# Patient Record
Sex: FEMALE | Race: WHITE | NOT HISPANIC OR LATINO | Employment: FULL TIME | URBAN - METROPOLITAN AREA
[De-identification: names, ages, dates, MRNs, and addresses within clinical notes are randomized per-mention and may not be internally consistent; named-entity substitution may affect disease eponyms.]

---

## 2020-04-20 ENCOUNTER — LAB REQUISITION (OUTPATIENT)
Dept: LAB | Facility: HOSPITAL | Age: 40
End: 2020-04-20

## 2020-04-20 DIAGNOSIS — N92.1 EXCESSIVE AND FREQUENT MENSTRUATION WITH IRREGULAR CYCLE: ICD-10-CM

## 2020-06-12 ENCOUNTER — APPOINTMENT (OUTPATIENT)
Dept: GENERAL RADIOLOGY | Facility: HOSPITAL | Age: 40
End: 2020-06-12

## 2020-06-12 ENCOUNTER — TRANSCRIBE ORDERS (OUTPATIENT)
Dept: ADMINISTRATIVE | Facility: HOSPITAL | Age: 40
End: 2020-06-12

## 2020-06-12 DIAGNOSIS — M25.511 ACUTE PAIN OF RIGHT SHOULDER: Primary | ICD-10-CM

## 2020-11-24 ENCOUNTER — TELEPHONE (OUTPATIENT)
Dept: ENDOCRINOLOGY | Facility: CLINIC | Age: 40
End: 2020-11-24

## 2020-11-24 DIAGNOSIS — E55.9 VITAMIN D DEFICIENCY: ICD-10-CM

## 2020-11-24 DIAGNOSIS — E05.00 GRAVES' DISEASE: Primary | ICD-10-CM

## 2020-11-24 NOTE — TELEPHONE ENCOUNTER
PT CALLED AND ASKED IF WE COULD PUT LAB ORDERS FOR HER INTO Epic PRIOR TO HER NEXT APPT. PLEASE AND THANK YOU

## 2021-01-19 ENCOUNTER — TELEPHONE (OUTPATIENT)
Dept: ENDOCRINOLOGY | Facility: CLINIC | Age: 41
End: 2021-01-19

## 2021-01-19 NOTE — TELEPHONE ENCOUNTER
Pt has a FOV on 1/26. She wanted to know if her lab orders are still active? I looked and did see a future lab order put in from 11/24 but I did not see an expiration date like I normally would and just wanted clarification.

## 2021-01-20 ENCOUNTER — LAB (OUTPATIENT)
Dept: LAB | Facility: HOSPITAL | Age: 41
End: 2021-01-20

## 2021-01-20 DIAGNOSIS — E55.9 VITAMIN D DEFICIENCY: ICD-10-CM

## 2021-01-20 DIAGNOSIS — E05.00 GRAVES' DISEASE: ICD-10-CM

## 2021-01-20 PROCEDURE — 82306 VITAMIN D 25 HYDROXY: CPT

## 2021-01-20 PROCEDURE — 84439 ASSAY OF FREE THYROXINE: CPT

## 2021-01-20 PROCEDURE — 84443 ASSAY THYROID STIM HORMONE: CPT

## 2021-01-20 NOTE — TELEPHONE ENCOUNTER
SHANNANOM advising pt. There are active lab orders in for her if she is going to a [facility] facility if she os not then we would have to send lab orders somewhere for her. CELIA

## 2021-01-21 LAB
25(OH)D3 SERPL-MCNC: 18.4 NG/ML (ref 30–100)
T4 FREE SERPL-MCNC: 1.28 NG/DL (ref 0.93–1.7)
TSH SERPL DL<=0.05 MIU/L-ACNC: 1.51 UIU/ML (ref 0.27–4.2)

## 2021-01-26 ENCOUNTER — OFFICE VISIT (OUTPATIENT)
Dept: ENDOCRINOLOGY | Facility: CLINIC | Age: 41
End: 2021-01-26

## 2021-01-26 VITALS
WEIGHT: 206 LBS | TEMPERATURE: 97.1 F | DIASTOLIC BLOOD PRESSURE: 90 MMHG | HEART RATE: 104 BPM | OXYGEN SATURATION: 98 % | BODY MASS INDEX: 40.44 KG/M2 | HEIGHT: 60 IN | SYSTOLIC BLOOD PRESSURE: 140 MMHG

## 2021-01-26 DIAGNOSIS — E66.01 CLASS 3 SEVERE OBESITY DUE TO EXCESS CALORIES WITHOUT SERIOUS COMORBIDITY WITH BODY MASS INDEX (BMI) OF 40.0 TO 44.9 IN ADULT (HCC): ICD-10-CM

## 2021-01-26 DIAGNOSIS — E55.9 VITAMIN D DEFICIENCY: ICD-10-CM

## 2021-01-26 DIAGNOSIS — E05.00 GRAVES' DISEASE: Primary | ICD-10-CM

## 2021-01-26 PROBLEM — E66.813 CLASS 3 SEVERE OBESITY DUE TO EXCESS CALORIES WITHOUT SERIOUS COMORBIDITY WITH BODY MASS INDEX (BMI) OF 40.0 TO 44.9 IN ADULT: Status: ACTIVE | Noted: 2021-01-26

## 2021-01-26 PROCEDURE — 99214 OFFICE O/P EST MOD 30 MIN: CPT | Performed by: PHYSICIAN ASSISTANT

## 2021-01-26 RX ORDER — ERGOCALCIFEROL 1.25 MG/1
50000 CAPSULE ORAL WEEKLY
Qty: 12 CAPSULE | Refills: 0 | Status: SHIPPED | OUTPATIENT
Start: 2021-01-26

## 2021-01-26 NOTE — PROGRESS NOTES
Office Note      Date: 2021  Patient Name: Linda Snell  MRN: [id number]  : 1980    Chief Complaint   Patient presents with   • Thyroid Problem       History of Present Illness:   Linda Snell is a 40 y.o. female who presents today for Graves' disease and vitamin D deficiency.   It has been a year since her last appointment.  She reports recently she has noticed increased palpitations that have been worse the last month.  She is wondering if this could be her thyroid.  She is not on any thyroid medication at this time.  She denies any other symptoms of hyper or hypothyroidism other than fatigue.  She reports she finished the prescription vitamin D we prescribed last January after 12 weeks and was unable to follow-up due to Covid.  She is not taking any vitamin D currently.  She reports she never was able to schedule nutrition counseling.  She reports she is not sure if she was just unable to get an appointment scheduled due to Covid her she was never contacted.  She checked into [facility] with her insurance and this is not covered.  She continues to see her eye doctor every 6 months pressure in her eyes is stable at this time.  She denies any changes in her neck.    Subjective      Review of Systems:  Review of Systems   Constitutional: Positive for fatigue. Negative for activity change, appetite change, chills, diaphoresis, fever and unexpected weight change.   Cardiovascular: Positive for palpitations. Negative for chest pain and leg swelling.   Gastrointestinal: Negative.    Endocrine: Negative.    Neurological: Negative.        The following portions of the patient's history were reviewed and updated as appropriate: allergies, current medications, past family history, past medical history, past social history, past surgical history and problem list.    Objective     Vitals:    21 1510   BP: 140/90   BP Location: Right arm   Patient Position: Sitting   Cuff Size: Adult  "  Pulse: 104   Temp: 97.1 °F (36.2 °C)   TempSrc: Infrared   SpO2: 98%   Weight: 93.4 kg (206 lb)   Height: 152.4 cm (60\")   PainSc: 0-No pain     Body mass index is 40.23 kg/m².    Physical Exam  Vitals signs reviewed.   Constitutional:       Appearance: Normal appearance. She is obese.   Neck:      Thyroid: No thyroid mass, thyromegaly or thyroid tenderness.   Lymphadenopathy:      Cervical: No cervical adenopathy.   Neurological:      Mental Status: She is alert.         TSH  1/20/2021  1.51    FREE T4  Free T4   Date Value Ref Range Status   01/20/2021 1.28 0.93 - 1.70 ng/dL Final           Assessment / Plan      Assessment & Plan:  1. Graves' disease  Her TFTs were normal last week.  Reviewed results with patient advised her we do not need any thyroid medication at this time.    - T4, Free; Future  - TSH; Future    2. Vitamin D deficiency  Vitamin D level is low at 18.4.  We will add ergocalciferol 50,000 IUs weekly for 12 weeks and recheck vitamin D level in 3 months for monitoring.  She will also continue her vitamin D3 gummy 2000 IUs once a week.  - Vitamin D 25 Hydroxy; Future     3. Obesity    Her weight is up 5 pounds since January.  Will send a new referral for nutrition education.  Saxenda is not covered under her insurance.    Return in about 6 months (around 7/26/2021) for Recheck.    JOHANN Barrera   01/26/2021  "

## 2021-02-04 ENCOUNTER — TRANSCRIBE ORDERS (OUTPATIENT)
Dept: ADMINISTRATIVE | Facility: HOSPITAL | Age: 41
End: 2021-02-04

## 2021-02-04 DIAGNOSIS — Z12.31 VISIT FOR SCREENING MAMMOGRAM: Primary | ICD-10-CM

## 2021-02-10 ENCOUNTER — APPOINTMENT (OUTPATIENT)
Dept: MAMMOGRAPHY | Facility: HOSPITAL | Age: 41
End: 2021-02-10

## 2021-02-12 ENCOUNTER — HOSPITAL ENCOUNTER (OUTPATIENT)
Dept: NUTRITION | Facility: HOSPITAL | Age: 41
Setting detail: RECURRING SERIES
Discharge: HOME | End: 2021-02-12

## 2021-02-12 VITALS — BODY MASS INDEX: 41.23 KG/M2 | HEIGHT: 60 IN | WEIGHT: 210 LBS

## 2021-02-12 PROCEDURE — 97802 MEDICAL NUTRITION INDIV IN: CPT

## 2021-02-12 NOTE — CONSULTS
Adult Outpatient Nutrition  Assessment/PES    Patient Name:  Linda Snell  YOB: 1980  MRN: [id number]    Assessment Date:  2/12/2021    Comments:      This medical referred consult was provided via ZOOM as patient was unable to attend an in-office appointment today due to the COVID-19 crisis. Consent for treatment was given verbally. RD spent a total of 60 minutes with patient today.      Patient is a 40 year old female seen today for an initial nutrition visit. Patient shared she is seeking support for sustainable weight loss. She reported she has a h/o indigestion and graves disease. She also reported she has very low Vitamin D levels (01/20/21 - 18.4 ng/mL). She is taking 50,000 IU Vitamin D weekly. Patient reported she experienced weight gain recently. In the past, she stated she could lose weight fairly quickly by going on a diet, but she reported things have not been working for her this time around. Patient works a full time job Monday through Friday. She shared that due to her low vitamin d levels, she experiences severe fatigue. Patient stated that she hopes by developing healthier eating habits it will help with her energy levels and overall health. She reported she is not involved in an exercise routine at the moment and would like to work on nutrition first. She reported she struggles with feeling hungry and snacking continuously out of boredom. Health literacy assessment completed prior to visit and patient demonstrated adequate health literacy. Patient stated she has no specific questions or topics she would like to discuss today.    RD discussed the importance of eating consistent and balanced meals. RD recommended patient avoid skipping meals or going more than 4-5 hours without a balanced meal or snack. We discussed how not receiving consistent energy can lead to over eating later on in the day. We discussed the importance of honoring our hunger and fullness ques and choosing  foods that are balanced, nutritious, and filling. RD used the plate method to discuss the components of a balanced meal. Patient compared her typical meal by sharing her plate is usually half rice with 1/4 vegetables and 1/4 protein. She stated this tool was very helpful in giving her an idea of portion sizes.  RD shared [email or web address] with mother as a free recipe resource for meal ideas. We discussed the importance of eating balanced meals and snacks to promote satiety and to provide our bodies with the nutrition we need. RD recommended patient have a source of protein, carbohydrates, and heart healthy fat with every meal. Patient was able to teach back concepts discussed by suggesting she could have a serving of whole grain crackers with a cheese stick as a balanced snack. RD shared with the patient common foods that can lead to indigestion such as acid foods like tomatoes, chocolate, citrus, peppermint, spices. We also discussed how going too long without leading may promote indigestion from the acidity of our stomachs. Patient that this was very helpful. RD mailed patient several handouts regarding healthy recipes and eating out tips and tricks. Patient set her own goal and stated she feels confident she can achieve it. RD encouraged patient to contact her with any needs prior to our next visit.       Goals:  1) Follow the plate method 2 meals per day, 5 days per week.      Total of 60 minutes spent with patient on nutrition counseling. Education based on Academy of Dietetics and Nutrition guidelines. Patient was provided with RD's contact information. Follow up visit is scheduled for 03/12/21 at  Thank you for this referral.    General Info     Row Name 02/12/21 3588       Today's Session    Person(s) attending today's session  Patient     Services Used Today?  No       General Information    How Well Do You Speak English?  very well    Preferred Language  English    Are you able to read and  "write English?  Yes    Lives With  spouse    Last grade of school completed  college          Anthropometrics     Row Name 02/12/21 1623 02/12/21 1614       Anthropometrics    Height  152.4 cm (60\")  152.4 cm (60\")    Weight  --  95.3 kg (210 lb)       Ideal Body Weight (IBW)    Ideal Body Weight (IBW) (kg)  45.86  45.86    % Ideal Body Weight  --  207.71       Body Mass Index (BMI)    BMI (kg/m2)  --  41.1        Nutritional Info/Activity     Row Name 02/12/21 1615       Nutritional Information    Have you had weight changes?  Yes    Describe weight changes  Patient reported she has gained 47 lbs since 2017.    Have you tried to lose weight before?  Yes    List programs tried, date, and success  counting calories and walking 1-2 miles daily    What is your motivation to lose weight?  health, trying to conceive    What cultural diet influences are important for you to follow?  none    Do you have difficulty chewing food?  No    Functional Status  able to prepare meals;able to purchase food    List any food cravings/trigger foods you have  sweets, rice    How often during the day do you find yourself snacking?  throughout the day    Food Behaviors  Stress eater;Boredom eater;Comfort eater;Continuous snacking    How often do you eat out and where?  1-3 times per week    Do you use Food Assistance programs (WIC, food stamps, food bank)?  no    Do you need information about Food Assistance programs?  no    How many times do you drink milk per day?  0    How many times do you eat fruit per day?  0    How many times do you eat vegetables per day?  1    How many times do you drink juice per day?  0    How many times do you eat candy/chocolates per day?  0    How many times do you eat baked goods per day?  1    How many times do you eat desserts per day?  0    How many times do you eat ice cream per day?  0    How many times do you eat snack foods per day?  1    How many diet sodas do you drink per day?  0    How many " "regular sodas do you drink per day?  0    How many times do you eat ethnic food per day?  1    How many times do you drink alcohol per day?  0    How many times do you have caffeine per day?  1    How many servings of artificial sweetner do you have per day?  0    How many meals do you eat each day?  2    How many snacks do you eat each day?  2    What is the biggest challenge you have with your diet?  -- Energy and cravings    What type of support do you currently use to help you with your health issues?  none    Enter everything you can remember eating in the last 24 hours (1 day) Breakfast: 1/2 cup oatmeal    Snack: 4 oz strawberry milk, 1 small bag dried noodle snack    Lunch: peanut butter and jelly sandwich with medium starbucks frappachino    Snack: handful of peanut butter pretzels    Dinner: 4 oz pork chop, 1/2 cup vegetables, 1 cup rice       Eating Environment    Eating environment  Alone       Physical Activity    Are you currently involved in an activity/exercise program?   No          Estimated/Assessed Needs     Row Name 02/12/21 1623 02/12/21 1614       Calculation Measurements    Weight Used For Calculations  95.3 kg (210 lb)  --    Height  152.4 cm (60\")  152.4 cm (60\")       Estimated/Assessed Needs    Additional Documentation  Brantley-St. Jeor Equation (Group)  --       Brantley-St. Jeor Equation    RMR (Brantley-St. Jeor Equation)  1544.05  --    Brantley-St. Jeor Activity Factors  1.2  --    Activity Factors (Brantley-St. Jeor)  1852.86  --                Problem/Interventions:  Problem 1     Row Name 02/12/21 1623          Nutrition Diagnoses Problem 1    Problem 1  Overweight/Obesity     Etiology (related to)  -- lifestyle     Signs/Symptoms (evidenced by)  BMI     BMI  Greater than 40                 Intervention Goal     Row Name 02/12/21 1624          Intervention Goal    General  Meet nutritional needs for age/condition     Weight  Appropriate weight loss             Education/Evaluation     " Row Name 02/12/21 1624          Education    Education  Advised regarding habits/behavior     Advised Regarding Habits/Behavior  Activity;Appropriate beverage;Appropriate portions;Eating pattern;Eating out;Food choices;Food prep;Meal planning;Increased nutrient density;Snacks        Monitor/Evaluation    Monitor  PO intake;Weight     Education Follow-up  Reinforce PRN           Electronically signed by:  Estefany Blanca RD  02/12/21 16:25 EST

## 2021-03-08 ENCOUNTER — HOSPITAL ENCOUNTER (OUTPATIENT)
Dept: MAMMOGRAPHY | Facility: HOSPITAL | Age: 41
Discharge: HOME | End: 2021-03-08
Admitting: OBSTETRICS & GYNECOLOGY

## 2021-03-08 DIAGNOSIS — Z12.31 VISIT FOR SCREENING MAMMOGRAM: ICD-10-CM

## 2021-03-08 PROCEDURE — 77067 SCR MAMMO BI INCL CAD: CPT | Performed by: RADIOLOGY

## 2021-03-08 PROCEDURE — 77063 BREAST TOMOSYNTHESIS BI: CPT

## 2021-03-08 PROCEDURE — 77063 BREAST TOMOSYNTHESIS BI: CPT | Performed by: RADIOLOGY

## 2021-03-08 PROCEDURE — 77067 SCR MAMMO BI INCL CAD: CPT

## 2021-03-12 ENCOUNTER — APPOINTMENT (OUTPATIENT)
Dept: NUTRITION | Facility: HOSPITAL | Age: 41
End: 2021-03-12

## 2021-03-24 ENCOUNTER — HOSPITAL ENCOUNTER (OUTPATIENT)
Dept: ULTRASOUND IMAGING | Facility: HOSPITAL | Age: 41
Discharge: HOME | End: 2021-03-24

## 2021-03-24 ENCOUNTER — HOSPITAL ENCOUNTER (OUTPATIENT)
Dept: MAMMOGRAPHY | Facility: HOSPITAL | Age: 41
Discharge: HOME | End: 2021-03-24

## 2021-03-24 DIAGNOSIS — R92.8 ABNORMAL MAMMOGRAM: ICD-10-CM

## 2021-03-24 PROCEDURE — 77065 DX MAMMO INCL CAD UNI: CPT | Performed by: RADIOLOGY

## 2021-03-24 PROCEDURE — 76642 ULTRASOUND BREAST LIMITED: CPT

## 2021-03-24 PROCEDURE — 77065 DX MAMMO INCL CAD UNI: CPT

## 2021-03-24 PROCEDURE — 76642 ULTRASOUND BREAST LIMITED: CPT | Performed by: RADIOLOGY

## 2021-03-24 PROCEDURE — G0279 TOMOSYNTHESIS, MAMMO: HCPCS

## 2021-03-24 PROCEDURE — 77061 BREAST TOMOSYNTHESIS UNI: CPT | Performed by: RADIOLOGY

## 2022-01-30 PROCEDURE — U0004 COV-19 TEST NON-CDC HGH THRU: HCPCS | Performed by: NURSE PRACTITIONER

## 2022-09-02 ENCOUNTER — TRANSCRIBE ORDERS (OUTPATIENT)
Dept: ADMINISTRATIVE | Facility: HOSPITAL | Age: 42
End: 2022-09-02

## 2022-09-02 DIAGNOSIS — Z12.31 VISIT FOR SCREENING MAMMOGRAM: Primary | ICD-10-CM

## 2022-10-04 ENCOUNTER — HOSPITAL ENCOUNTER (OUTPATIENT)
Dept: MAMMOGRAPHY | Facility: HOSPITAL | Age: 42
Discharge: HOME | End: 2022-10-04
Admitting: OBSTETRICS & GYNECOLOGY

## 2022-10-04 DIAGNOSIS — Z12.31 VISIT FOR SCREENING MAMMOGRAM: ICD-10-CM

## 2022-10-04 PROCEDURE — 77067 SCR MAMMO BI INCL CAD: CPT

## 2022-10-04 PROCEDURE — 77063 BREAST TOMOSYNTHESIS BI: CPT | Performed by: RADIOLOGY

## 2022-10-04 PROCEDURE — 77063 BREAST TOMOSYNTHESIS BI: CPT

## 2022-10-04 PROCEDURE — 77067 SCR MAMMO BI INCL CAD: CPT | Performed by: RADIOLOGY

## 2023-11-21 ENCOUNTER — OFFICE VISIT (OUTPATIENT)
Dept: ENDOCRINOLOGY | Facility: CLINIC | Age: 43
End: 2023-11-21
Payer: COMMERCIAL

## 2023-11-21 VITALS
HEART RATE: 78 BPM | SYSTOLIC BLOOD PRESSURE: 132 MMHG | OXYGEN SATURATION: 97 % | DIASTOLIC BLOOD PRESSURE: 90 MMHG | WEIGHT: 203.2 LBS | HEIGHT: 61 IN | BODY MASS INDEX: 38.36 KG/M2

## 2023-11-21 DIAGNOSIS — E55.9 VITAMIN D DEFICIENCY: ICD-10-CM

## 2023-11-21 DIAGNOSIS — E05.00 GRAVES' DISEASE: Primary | ICD-10-CM

## 2023-11-21 LAB
25(OH)D3+25(OH)D2 SERPL-MCNC: 24.7 NG/ML (ref 30–100)
T4 FREE SERPL-MCNC: 1.3 NG/DL (ref 0.93–1.7)
TSH SERPL DL<=0.005 MIU/L-ACNC: 1.56 UIU/ML (ref 0.27–4.2)

## 2023-11-21 PROCEDURE — 99214 OFFICE O/P EST MOD 30 MIN: CPT | Performed by: PHYSICIAN ASSISTANT

## 2023-11-21 PROCEDURE — 36415 COLL VENOUS BLD VENIPUNCTURE: CPT | Performed by: PHYSICIAN ASSISTANT

## 2023-11-21 RX ORDER — LISINOPRIL 20 MG/1
20 TABLET ORAL DAILY
COMMUNITY
Start: 2022-06-01

## 2023-11-21 RX ORDER — TIRZEPATIDE 5 MG/.5ML
5 INJECTION, SOLUTION SUBCUTANEOUS WEEKLY
COMMUNITY

## 2023-11-21 RX ORDER — ONDANSETRON HYDROCHLORIDE 8 MG/1
1 TABLET, FILM COATED ORAL AS NEEDED
COMMUNITY
Start: 2023-09-19

## 2023-11-21 NOTE — PROGRESS NOTES
Office Note      Date: 2023  Patient Name: Linda Snell  MRN: [id number]  : 1980    Chief Complaint   Patient presents with    Graves' Disease       History of Present Illness:   Linda Snell is a 43 y.o. female who presents today for follow-up for Graves' disease.  It has been almost 3 years since her last appointment with her primary care physician has been checking her thyroid levels and these have been within normal limits.  She has been off methimazole for years.  She reports overall she feels well.  She denies any symptoms of hyper or hypothyroidism.  She denies any changes in her eyes or neck today.  She continues to see the eye doctor every 6 months.  She reports she is going to a weight loss clinic and started Mounjaro for weight loss.  She reports overall she has been able to lose weight on this medication and things seem to be going well.  She does have some trouble with indigestion and reflux but this is nothing new.  She reports she was started on Dexilant but this is no longer covered by her insurance.    She was started on metformin about 6 months ago because her A1c was creeping up.  She reports she could not tolerate this and is no longer taking it.  She reports she was taking prescription vitamin D but has been off of this for the last 3 weeks.  She reports her primary care physician was checking this that she is due to have her vitamin D level checked and is asking if we can check it today.      Subjective      Review of Systems:  Review of Systems   Constitutional: Negative.    Cardiovascular: Negative.    Gastrointestinal: Negative.    Endocrine: Negative.    Neurological: Negative.        The following portions of the patient's history were reviewed and updated as appropriate: allergies, current medications, past family history, past medical history, past social history, past surgical history, and problem list.    Objective     Vitals:    23 0840   BP:  "132/90   BP Location: Left arm   Patient Position: Sitting   Cuff Size: Adult   Pulse: 78   SpO2: 97%   Weight: 92.2 kg (203 lb 3.2 oz)   Height: 153.7 cm (60.5\")     Body mass index is 39.03 kg/m².    Physical Exam  Vitals reviewed.   Constitutional:       General: She is not in acute distress.     Appearance: Normal appearance.   Neck:      Thyroid: No thyroid mass, thyromegaly or thyroid tenderness.   Lymphadenopathy:      Cervical: No cervical adenopathy.   Neurological:      Mental Status: She is alert.           Assessment / Plan      Assessment & Plan:  1. Graves' disease  TFTs pending today.  She is currently on no thyroid medication.  Will send note with results and plan.  - T4, Free; Future  - TSH; Future  - TSH  - T4, Free    2. Vitamin D deficiency  25-hydroxy vitamin D pending today.  Will send note with results and plan.  She has been off her vitamin D 50,000 IUs weekly for 3 weeks now.  - Vitamin D,25-Hydroxy; Future  - Vitamin D,25-Hydroxy       Return in about 1 year (around 11/21/2024) for Recheck.    This note was dictated using Dragon voice recognition.    JOHANN Barrera   11/21/2023  "

## 2023-11-22 RX ORDER — ERGOCALCIFEROL 1.25 MG/1
50000 CAPSULE ORAL WEEKLY
Qty: 12 CAPSULE | Refills: 0 | Status: SHIPPED | OUTPATIENT
Start: 2023-11-22

## 2024-07-29 ENCOUNTER — HOSPITAL ENCOUNTER (OUTPATIENT)
Facility: HOSPITAL | Age: 44
Setting detail: OBSERVATION
Discharge: HOME | End: 2024-08-01
Attending: STUDENT IN AN ORGANIZED HEALTH CARE EDUCATION/TRAINING PROGRAM | Admitting: SURGERY
Payer: COMMERCIAL

## 2024-07-29 ENCOUNTER — APPOINTMENT (OUTPATIENT)
Dept: CT IMAGING | Facility: HOSPITAL | Age: 44
End: 2024-07-29
Payer: COMMERCIAL

## 2024-07-29 ENCOUNTER — APPOINTMENT (OUTPATIENT)
Dept: ULTRASOUND IMAGING | Facility: HOSPITAL | Age: 44
End: 2024-07-29
Payer: COMMERCIAL

## 2024-07-29 DIAGNOSIS — Z90.49 S/P LAPAROSCOPIC CHOLECYSTECTOMY: ICD-10-CM

## 2024-07-29 DIAGNOSIS — R10.13 EPIGASTRIC PAIN: ICD-10-CM

## 2024-07-29 DIAGNOSIS — K81.0 ACUTE CHOLECYSTITIS: Primary | ICD-10-CM

## 2024-07-29 DIAGNOSIS — K80.00 ACUTE CALCULOUS CHOLECYSTITIS: ICD-10-CM

## 2024-07-29 DIAGNOSIS — K80.50 BILIARY COLIC: ICD-10-CM

## 2024-07-29 DIAGNOSIS — D72.829 LEUKOCYTOSIS, UNSPECIFIED TYPE: ICD-10-CM

## 2024-07-29 DIAGNOSIS — R11.2 NAUSEA AND VOMITING, UNSPECIFIED VOMITING TYPE: ICD-10-CM

## 2024-07-29 LAB
ALBUMIN SERPL-MCNC: 4.4 G/DL (ref 3.5–5.2)
ALBUMIN/GLOB SERPL: 1.3 G/DL
ALP SERPL-CCNC: 80 U/L (ref 39–117)
ALT SERPL W P-5'-P-CCNC: 14 U/L (ref 1–33)
ANION GAP SERPL CALCULATED.3IONS-SCNC: 14.4 MMOL/L (ref 5–15)
AST SERPL-CCNC: 12 U/L (ref 1–32)
BACTERIA UR QL AUTO: ABNORMAL /HPF
BASOPHILS # BLD AUTO: 0.07 10*3/MM3 (ref 0–0.2)
BASOPHILS NFR BLD AUTO: 0.4 % (ref 0–1.5)
BILIRUB SERPL-MCNC: 0.6 MG/DL (ref 0–1.2)
BILIRUB UR QL STRIP: NEGATIVE
BUN SERPL-MCNC: 15 MG/DL (ref 6–20)
BUN/CREAT SERPL: 18.1 (ref 7–25)
CALCIUM SPEC-SCNC: 9.6 MG/DL (ref 8.6–10.5)
CHLORIDE SERPL-SCNC: 101 MMOL/L (ref 98–107)
CLARITY UR: CLEAR
CO2 SERPL-SCNC: 21.6 MMOL/L (ref 22–29)
COLOR UR: YELLOW
CREAT SERPL-MCNC: 0.83 MG/DL (ref 0.57–1)
DEPRECATED RDW RBC AUTO: 39.8 FL (ref 37–54)
EGFRCR SERPLBLD CKD-EPI 2021: 89.3 ML/MIN/1.73
EOSINOPHIL # BLD AUTO: 0.09 10*3/MM3 (ref 0–0.4)
EOSINOPHIL NFR BLD AUTO: 0.5 % (ref 0.3–6.2)
ERYTHROCYTE [DISTWIDTH] IN BLOOD BY AUTOMATED COUNT: 12.9 % (ref 12.3–15.4)
GLOBULIN UR ELPH-MCNC: 3.3 GM/DL
GLUCOSE SERPL-MCNC: 187 MG/DL (ref 65–99)
GLUCOSE UR STRIP-MCNC: NEGATIVE MG/DL
HCT VFR BLD AUTO: 39.4 % (ref 34–46.6)
HGB BLD-MCNC: 13 G/DL (ref 12–15.9)
HGB UR QL STRIP.AUTO: ABNORMAL
HOLD SPECIMEN: NORMAL
HOLD SPECIMEN: NORMAL
HYALINE CASTS UR QL AUTO: ABNORMAL /LPF
IMM GRANULOCYTES # BLD AUTO: 0.08 10*3/MM3 (ref 0–0.05)
IMM GRANULOCYTES NFR BLD AUTO: 0.4 % (ref 0–0.5)
KETONES UR QL STRIP: ABNORMAL
LEUKOCYTE ESTERASE UR QL STRIP.AUTO: NEGATIVE
LIPASE SERPL-CCNC: 26 U/L (ref 13–60)
LYMPHOCYTES # BLD AUTO: 1.35 10*3/MM3 (ref 0.7–3.1)
LYMPHOCYTES NFR BLD AUTO: 7 % (ref 19.6–45.3)
MCH RBC QN AUTO: 28.3 PG (ref 26.6–33)
MCHC RBC AUTO-ENTMCNC: 33 G/DL (ref 31.5–35.7)
MCV RBC AUTO: 85.7 FL (ref 79–97)
MONOCYTES # BLD AUTO: 0.64 10*3/MM3 (ref 0.1–0.9)
MONOCYTES NFR BLD AUTO: 3.3 % (ref 5–12)
MUCOUS THREADS URNS QL MICRO: ABNORMAL /HPF
NEUTROPHILS NFR BLD AUTO: 17.19 10*3/MM3 (ref 1.7–7)
NEUTROPHILS NFR BLD AUTO: 88.4 % (ref 42.7–76)
NITRITE UR QL STRIP: NEGATIVE
NRBC BLD AUTO-RTO: 0 /100 WBC (ref 0–0.2)
PH UR STRIP.AUTO: <=5 [PH] (ref 5–8)
PLATELET # BLD AUTO: 354 10*3/MM3 (ref 140–450)
PMV BLD AUTO: 9.1 FL (ref 6–12)
POTASSIUM SERPL-SCNC: 4.4 MMOL/L (ref 3.5–5.2)
PROT SERPL-MCNC: 7.7 G/DL (ref 6–8.5)
PROT UR QL STRIP: ABNORMAL
RBC # BLD AUTO: 4.6 10*6/MM3 (ref 3.77–5.28)
RBC # UR STRIP: ABNORMAL /HPF
REF LAB TEST METHOD: ABNORMAL
SODIUM SERPL-SCNC: 137 MMOL/L (ref 136–145)
SP GR UR STRIP: >1.03 (ref 1–1.03)
SQUAMOUS #/AREA URNS HPF: ABNORMAL /HPF
TROPONIN T SERPL HS-MCNC: <6 NG/L
UROBILINOGEN UR QL STRIP: ABNORMAL
WBC # UR STRIP: ABNORMAL /HPF
WBC NRBC COR # BLD AUTO: 19.42 10*3/MM3 (ref 3.4–10.8)
WHOLE BLOOD HOLD COAG: NORMAL
WHOLE BLOOD HOLD SPECIMEN: NORMAL

## 2024-07-29 PROCEDURE — 84484 ASSAY OF TROPONIN QUANT: CPT | Performed by: STUDENT IN AN ORGANIZED HEALTH CARE EDUCATION/TRAINING PROGRAM

## 2024-07-29 PROCEDURE — 25510000001 IOPAMIDOL 61 % SOLUTION: Performed by: STUDENT IN AN ORGANIZED HEALTH CARE EDUCATION/TRAINING PROGRAM

## 2024-07-29 PROCEDURE — 25010000002 ONDANSETRON PER 1 MG: Performed by: STUDENT IN AN ORGANIZED HEALTH CARE EDUCATION/TRAINING PROGRAM

## 2024-07-29 PROCEDURE — 83690 ASSAY OF LIPASE: CPT | Performed by: STUDENT IN AN ORGANIZED HEALTH CARE EDUCATION/TRAINING PROGRAM

## 2024-07-29 PROCEDURE — G0378 HOSPITAL OBSERVATION PER HR: HCPCS

## 2024-07-29 PROCEDURE — 36415 COLL VENOUS BLD VENIPUNCTURE: CPT

## 2024-07-29 PROCEDURE — 96372 THER/PROPH/DIAG INJ SC/IM: CPT

## 2024-07-29 PROCEDURE — 80053 COMPREHEN METABOLIC PANEL: CPT | Performed by: STUDENT IN AN ORGANIZED HEALTH CARE EDUCATION/TRAINING PROGRAM

## 2024-07-29 PROCEDURE — 25010000002 PIPERACILLIN SOD-TAZOBACTAM PER 1 G: Performed by: SURGERY

## 2024-07-29 PROCEDURE — 76705 ECHO EXAM OF ABDOMEN: CPT

## 2024-07-29 PROCEDURE — 25010000002 ONDANSETRON PER 1 MG: Performed by: SURGERY

## 2024-07-29 PROCEDURE — 81001 URINALYSIS AUTO W/SCOPE: CPT | Performed by: STUDENT IN AN ORGANIZED HEALTH CARE EDUCATION/TRAINING PROGRAM

## 2024-07-29 PROCEDURE — 25010000002 HEPARIN (PORCINE) PER 1000 UNITS: Performed by: SURGERY

## 2024-07-29 PROCEDURE — 25010000002 MORPHINE PER 10 MG: Performed by: STUDENT IN AN ORGANIZED HEALTH CARE EDUCATION/TRAINING PROGRAM

## 2024-07-29 PROCEDURE — 96376 TX/PRO/DX INJ SAME DRUG ADON: CPT

## 2024-07-29 PROCEDURE — 74177 CT ABD & PELVIS W/CONTRAST: CPT

## 2024-07-29 PROCEDURE — 99285 EMERGENCY DEPT VISIT HI MDM: CPT

## 2024-07-29 PROCEDURE — 25010000002 HYDROMORPHONE PER 4 MG: Performed by: STUDENT IN AN ORGANIZED HEALTH CARE EDUCATION/TRAINING PROGRAM

## 2024-07-29 PROCEDURE — 85025 COMPLETE CBC W/AUTO DIFF WBC: CPT | Performed by: STUDENT IN AN ORGANIZED HEALTH CARE EDUCATION/TRAINING PROGRAM

## 2024-07-29 PROCEDURE — 96375 TX/PRO/DX INJ NEW DRUG ADDON: CPT

## 2024-07-29 PROCEDURE — 25810000003 SODIUM CHLORIDE 0.9 % SOLUTION: Performed by: STUDENT IN AN ORGANIZED HEALTH CARE EDUCATION/TRAINING PROGRAM

## 2024-07-29 PROCEDURE — 81025 URINE PREGNANCY TEST: CPT | Performed by: SURGERY

## 2024-07-29 PROCEDURE — 25010000002 HYDROMORPHONE 1 MG/ML SOLUTION: Performed by: SURGERY

## 2024-07-29 PROCEDURE — 25810000003 LACTATED RINGERS PER 1000 ML: Performed by: SURGERY

## 2024-07-29 PROCEDURE — 93005 ELECTROCARDIOGRAM TRACING: CPT | Performed by: STUDENT IN AN ORGANIZED HEALTH CARE EDUCATION/TRAINING PROGRAM

## 2024-07-29 PROCEDURE — 25010000002 PIPERACILLIN SOD-TAZOBACTAM PER 1 G: Performed by: STUDENT IN AN ORGANIZED HEALTH CARE EDUCATION/TRAINING PROGRAM

## 2024-07-29 RX ORDER — HEPARIN SODIUM 5000 [USP'U]/ML
5000 INJECTION, SOLUTION INTRAVENOUS; SUBCUTANEOUS EVERY 8 HOURS SCHEDULED
Status: DISCONTINUED | OUTPATIENT
Start: 2024-07-29 | End: 2024-08-01 | Stop reason: HOSPADM

## 2024-07-29 RX ORDER — SODIUM CHLORIDE 9 MG/ML
40 INJECTION, SOLUTION INTRAVENOUS AS NEEDED
Status: DISCONTINUED | OUTPATIENT
Start: 2024-07-29 | End: 2024-08-01 | Stop reason: HOSPADM

## 2024-07-29 RX ORDER — ONDANSETRON 2 MG/ML
4 INJECTION INTRAMUSCULAR; INTRAVENOUS ONCE
Status: COMPLETED | OUTPATIENT
Start: 2024-07-29 | End: 2024-07-29

## 2024-07-29 RX ORDER — LIDOCAINE HYDROCHLORIDE 20 MG/ML
5 SOLUTION OROPHARYNGEAL ONCE
Status: COMPLETED | OUTPATIENT
Start: 2024-07-29 | End: 2024-07-29

## 2024-07-29 RX ORDER — SODIUM CHLORIDE, SODIUM LACTATE, POTASSIUM CHLORIDE, CALCIUM CHLORIDE 600; 310; 30; 20 MG/100ML; MG/100ML; MG/100ML; MG/100ML
100 INJECTION, SOLUTION INTRAVENOUS CONTINUOUS
Status: DISCONTINUED | OUTPATIENT
Start: 2024-07-29 | End: 2024-07-30

## 2024-07-29 RX ORDER — ONDANSETRON 2 MG/ML
4 INJECTION INTRAMUSCULAR; INTRAVENOUS EVERY 6 HOURS PRN
Status: DISCONTINUED | OUTPATIENT
Start: 2024-07-29 | End: 2024-08-01 | Stop reason: HOSPADM

## 2024-07-29 RX ORDER — IBUPROFEN 600 MG/1
600 TABLET ORAL EVERY 6 HOURS PRN
Status: DISCONTINUED | OUTPATIENT
Start: 2024-07-29 | End: 2024-07-30

## 2024-07-29 RX ORDER — ALUMINA, MAGNESIA, AND SIMETHICONE 2400; 2400; 240 MG/30ML; MG/30ML; MG/30ML
15 SUSPENSION ORAL ONCE
Status: COMPLETED | OUTPATIENT
Start: 2024-07-29 | End: 2024-07-29

## 2024-07-29 RX ORDER — HYDROMORPHONE HYDROCHLORIDE 2 MG/ML
0.5 INJECTION, SOLUTION INTRAMUSCULAR; INTRAVENOUS; SUBCUTANEOUS ONCE
Status: COMPLETED | OUTPATIENT
Start: 2024-07-29 | End: 2024-07-29

## 2024-07-29 RX ORDER — SODIUM CHLORIDE 0.9 % (FLUSH) 0.9 %
10 SYRINGE (ML) INJECTION AS NEEDED
Status: DISCONTINUED | OUTPATIENT
Start: 2024-07-29 | End: 2024-08-01 | Stop reason: HOSPADM

## 2024-07-29 RX ORDER — DEXLANSOPRAZOLE 60 MG/1
60 CAPSULE, DELAYED RELEASE ORAL DAILY
COMMUNITY

## 2024-07-29 RX ORDER — SODIUM CHLORIDE 0.9 % (FLUSH) 0.9 %
10 SYRINGE (ML) INJECTION EVERY 12 HOURS SCHEDULED
Status: DISCONTINUED | OUTPATIENT
Start: 2024-07-29 | End: 2024-08-01 | Stop reason: HOSPADM

## 2024-07-29 RX ORDER — ACETAMINOPHEN 500 MG
1000 TABLET ORAL EVERY 6 HOURS PRN
Status: DISCONTINUED | OUTPATIENT
Start: 2024-07-29 | End: 2024-07-30

## 2024-07-29 RX ORDER — NALOXONE HCL 0.4 MG/ML
0.4 VIAL (ML) INJECTION
Status: DISCONTINUED | OUTPATIENT
Start: 2024-07-29 | End: 2024-08-01 | Stop reason: HOSPADM

## 2024-07-29 RX ADMIN — HEPARIN SODIUM 5000 UNITS: 5000 INJECTION INTRAVENOUS; SUBCUTANEOUS at 14:09

## 2024-07-29 RX ADMIN — IOPAMIDOL 100 ML: 612 INJECTION, SOLUTION INTRAVENOUS at 03:02

## 2024-07-29 RX ADMIN — PIPERACILLIN SODIUM AND TAZOBACTAM SODIUM 3.38 G: 3; .375 INJECTION, POWDER, LYOPHILIZED, FOR SOLUTION INTRAVENOUS at 06:37

## 2024-07-29 RX ADMIN — HYDROMORPHONE HYDROCHLORIDE 0.5 MG: 1 INJECTION, SOLUTION INTRAMUSCULAR; INTRAVENOUS; SUBCUTANEOUS at 13:13

## 2024-07-29 RX ADMIN — ONDANSETRON 4 MG: 2 INJECTION INTRAMUSCULAR; INTRAVENOUS at 01:58

## 2024-07-29 RX ADMIN — LIDOCAINE HYDROCHLORIDE 5 ML: 20 SOLUTION ORAL at 02:13

## 2024-07-29 RX ADMIN — Medication 10 ML: at 21:57

## 2024-07-29 RX ADMIN — SODIUM CHLORIDE 500 ML: 9 INJECTION, SOLUTION INTRAVENOUS at 01:58

## 2024-07-29 RX ADMIN — HYDROMORPHONE HYDROCHLORIDE 0.5 MG: 1 INJECTION, SOLUTION INTRAMUSCULAR; INTRAVENOUS; SUBCUTANEOUS at 21:56

## 2024-07-29 RX ADMIN — PIPERACILLIN SODIUM AND TAZOBACTAM SODIUM 3.38 G: 3; .375 INJECTION, POWDER, LYOPHILIZED, FOR SOLUTION INTRAVENOUS at 16:27

## 2024-07-29 RX ADMIN — ALUMINUM HYDROXIDE, MAGNESIUM HYDROXIDE, DIMETHICONE 15 ML: 400; 400; 40 SUSPENSION ORAL at 02:13

## 2024-07-29 RX ADMIN — SODIUM CHLORIDE 1000 ML: 9 INJECTION, SOLUTION INTRAVENOUS at 06:35

## 2024-07-29 RX ADMIN — ACETAMINOPHEN 1000 MG: 500 TABLET, FILM COATED ORAL at 22:21

## 2024-07-29 RX ADMIN — ONDANSETRON 4 MG: 2 INJECTION INTRAMUSCULAR; INTRAVENOUS at 10:58

## 2024-07-29 RX ADMIN — SODIUM CHLORIDE, POTASSIUM CHLORIDE, SODIUM LACTATE AND CALCIUM CHLORIDE 100 ML/HR: 600; 310; 30; 20 INJECTION, SOLUTION INTRAVENOUS at 08:54

## 2024-07-29 RX ADMIN — ONDANSETRON 4 MG: 2 INJECTION INTRAMUSCULAR; INTRAVENOUS at 04:12

## 2024-07-29 RX ADMIN — HEPARIN SODIUM 5000 UNITS: 5000 INJECTION INTRAVENOUS; SUBCUTANEOUS at 08:55

## 2024-07-29 RX ADMIN — MORPHINE SULFATE 4 MG: 4 INJECTION, SOLUTION INTRAMUSCULAR; INTRAVENOUS at 02:25

## 2024-07-29 RX ADMIN — HYDROMORPHONE HYDROCHLORIDE 0.5 MG: 2 INJECTION, SOLUTION INTRAMUSCULAR; INTRAVENOUS; SUBCUTANEOUS at 04:13

## 2024-07-29 RX ADMIN — HEPARIN SODIUM 5000 UNITS: 5000 INJECTION INTRAVENOUS; SUBCUTANEOUS at 21:56

## 2024-07-29 NOTE — ED NOTES
Pt sleeping after pain medication administration. O2 sat noted to be 88-90%. Pt placed on 2 LPM NC. Family at bedside. Lights dimmed to allow pt to rest.

## 2024-07-29 NOTE — H&P
"General Surgery Consult     Name:Linda Snell  Age: 44 y.o.  Gender: female  : 1980  MRN: [id number]  Visit Number: [id number]  Admit Date: 2024  Date of Service: 24    Patient Care Team:  Huma Steele APRN as PCP - General (Nurse Practitioner)    Reason for Consultation: Acute cholecystitis    Chief complaint : abdominal pain, nausea, vomiting      History of Present Illness:     Linda Snell is a 44 y.o. female patient who presented to the emergency department overnight complaining of a severe \"GERD attack.\"  She reports having severe GERD for at least the last 5 years for which she has been on and off various proton pump inhibitors.  However, she had worsening symptoms over the last month and had actually also been given Carafate due to the severity of her symptoms.  She reports having multiple episodes of similar symptoms prior to coming to the ER, but reports that all of these were fairly self-limited.  However, at 9 PM last night she developed what she described as another \"GERD attack\" characterized by right upper quadrant abdominal pain radiating to the back with multiple episodes of nausea, and vomiting.  She reports that the symptoms did not subside over time, subsequently prompting her to come to the emergency department.  Lab workup in the emergency department revealed an elevated white blood cell count at 19.42 with a noted left shift at 88% neutrophils.  Her comprehensive metabolic panel was largely unremarkable, specifically, her LFTs are normal. CT scan of the abdomen and pelvis revealed a distended gallbladder containing stones.  An ultrasound demonstrated similar findings.  I was contacted by the emergency department provider due to the patient's ongoing abdominal pain, and concern for acute cholecystitis.  We discussed the case, and I elected to admit the patient to the surgical service for definitive evaluation and management.        Patient Active Problem " List   Diagnosis    Graves' disease    Vitamin D deficiency    Class 3 severe obesity due to excess calories without serious comorbidity with body mass index (BMI) of 40.0 to 44.9 in adult    Acute cholecystitis    Acute calculous cholecystitis         Past Medical History:   Diagnosis Date    GERD (gastroesophageal reflux disease)     Graefe's disease     Graves disease     Hypertension     Hypothyroidism     Obesity     body mass index 30+-Obesity--Abstracted from [facility]    Vitamin D deficiency        Past Surgical History:   Procedure Laterality Date    PELVIC ABCESS DRAINAGE      WISDOM TOOTH EXTRACTION         Family History   Problem Relation Age of Onset    No Known Problems Mother     Kidney failure Father     Hypertension Father     No Known Problems Brother     Cancer Maternal Aunt     Breast cancer Maternal Aunt         DX AGE UNKNOWN    Ovarian cancer Neg Hx        Social History     Socioeconomic History    Marital status:    Tobacco Use    Smoking status: Never    Smokeless tobacco: Never   Vaping Use    Vaping status: Never Used   Substance and Sexual Activity    Alcohol use: Never    Drug use: Never    Sexual activity: Defer         Current Facility-Administered Medications:     sodium chloride 0.9 % flush 10 mL, 10 mL, Intravenous, PRN, Yariel Guerra MD    Medications Prior to Admission   Medication Sig Dispense Refill Last Dose    dexlansoprazole (Dexilant) 60 MG capsule Take 1 capsule by mouth Daily.       lisinopril (PRINIVIL,ZESTRIL) 20 MG tablet Take 1 tablet by mouth Daily.       ondansetron (ZOFRAN) 8 MG tablet Take 1 tablet by mouth As Needed. Due to Mounjaro Injection       Tirzepatide (Mounjaro) 5 MG/0.5ML solution pen-injector Inject 0.5 mL under the skin into the appropriate area as directed 1 (One) Time Per Week. (Patient not taking: Reported on 7/29/2024)   Not Taking    vitamin D (ERGOCALCIFEROL) 1.25 MG (76931 UT) capsule capsule Take 1 capsule by mouth 1 (One) Time Per  Week. For 12 weeks 12 capsule 0        Allergies   Allergen Reactions    Propylthiouracil Rash       Review of Systems   Constitutional: Negative.    HENT: Negative.     Eyes: Negative.    Respiratory: Negative.     Cardiovascular: Negative.    Gastrointestinal:  Positive for abdominal pain, nausea and vomiting.   Endocrine: Negative.    Genitourinary: Negative.    Musculoskeletal:  Positive for back pain.   Skin: Negative.    Allergic/Immunologic: Negative.    Neurological: Negative.    Hematological: Negative.    Psychiatric/Behavioral: Negative.         OBJECTIVE:     Vital Signs  Temp:  [98.1 °F (36.7 °C)] 98.1 °F (36.7 °C)  Heart Rate:  [58-85] 68  Resp:  [16-20] 16  BP: (118-149)/(71-99) 149/99    No intake/output data recorded.  No intake/output data recorded.      Physical Exam:      General Appearance:    Alert, cooperative, in no acute distress   Head:    Normocephalic, without obvious abnormality, atraumatic   Eyes:            Lids and lashes normal, conjunctivae and sclerae normal, no icterus   Ears:    Ears appear intact with no abnormalities noted   Lungs:     Respirations regular, even and unlabored    Heart:    Regular rhythm and normal rate   Abdomen:     Soft, mild ttp in the RUQ, no peritonitis,   non-distended, no guarding, no rebound   tenderness   Genitalia:    Deferred   Extremities:   Moves all extremities well, no edema, no cyanosis, no  redness   Pulses:   Pulses palpable and equal bilaterally   Skin:   No bleeding, bruising or rash   Neurologic:   AAOx3, no gross deficits         Results Review:  I have reviewed the entirety of the patient's clinical lab results.  I have also personally reviewed the patient's imaging    Narrative & Impression   FINAL REPORT     TECHNIQUE:  null     CLINICAL HISTORY:  Epigastric pain     COMPARISON:  null     FINDINGS:  CT abdomen and pelvis with contrast     Comparison: None     Findings:     Lung bases are clear. No acute bony abnormalities.     Liver and  spleen within normal limits.     Pancreas and adrenal glands unremarkable.     Cholelithiasis with gallbladder distention.     Recommend follow-up gallbladder ultrasound.     Bilateral kidneys demonstrate no focal abnormality.     No renal stones or hydronephrosis.     Abdominal aorta is normal in caliber.     No free fluid or adenopathy in the pelvis.     No acute diverticulitis. Appendix unremarkable.     4.4 cm right uterine fibroid noted.     3 cm left ovarian cyst.     IMPRESSION:  Impression:     Cholelithiasis with gallbladder distention     Recommend follow-up ultrasound     Authenticated and Electronically Signed by Santana San MD on  07/29/2024 04:06:58 AM         Narrative & Impression   FINAL REPORT     TECHNIQUE:  null     CLINICAL HISTORY:  Abdominal pain, leukocytosis, distended gallbladder     COMPARISON:  null     FINDINGS:  US abdomen limited     Comparison: CT/SR - CT ABDOMEN PELVIS W CONTRAST - 07/29/2024 02:32 AM EDT     Findings:     The visualized pancreas is normal.     The liver is normal in size and echotexture. The liver measures 14.8 cm.     There is no intrahepatic bile duct dilatation.     The common duct is 4-5 mm in diameter.     The gallbladder is distended measuring up to 10 cm in length. Echogenic cholelithiasis and sludge within the lumen of the gallbladder. The gallbladder wall measures 2 mm.     The main portal vein is antegrade.     The right kidney is normal in size and echotexture, 9.5 cm in length     No ascites.     IMPRESSION:  IMPRESSION:     1. A distended gallbladder measuring up to 10 cm in length. Echogenic cholelithiasis and sludge within the lumen of the gallbladder. If high clinical concern for cholecystitis recommend nuclear medicine HIDA scan.     2. Normal common bile duct measuring 4-5 mm in diameter.     Authenticated and Electronically Signed by Blaze Hawkins DO on  07/29/2024 06:17:02 AM     Lab Results (last 72 hours)       Procedure Component Value  Units Date/Time    Urinalysis, Microscopic Only - Urine, Clean Catch [[id number]]  (Abnormal) Collected: 07/29/24 0311    Specimen: Urine, Clean Catch Updated: 07/29/24 0323     RBC, UA None Seen /HPF      WBC, UA 0-2 /HPF      Bacteria, UA Trace /HPF      Squamous Epithelial Cells, UA 0-2 /HPF      Hyaline Casts, UA None Seen /LPF      Mucus, UA Trace /HPF      Methodology Manual Light Microscopy    Urinalysis With Microscopic If Indicated (No Culture) - Urine, Clean Catch [[id number]]  (Abnormal) Collected: 07/29/24 0311    Specimen: Urine, Clean Catch Updated: 07/29/24 0320     Color, UA Yellow     Appearance, UA Clear     pH, UA <=5.0     Specific Gravity, UA >1.030     Glucose, UA Negative     Ketones, UA 15 mg/dL (1+)     Bilirubin, UA Negative     Blood, UA Small (1+)     Protein, UA 30 mg/dL (1+)     Leuk Esterase, UA Negative     Nitrite, UA Negative     Urobilinogen, UA 0.2 E.U./dL    Single High Sensitivity Troponin T [[id number]]  (Normal) Collected: 07/29/24 0122    Specimen: Blood Updated: 07/29/24 0219     HS Troponin T <6 ng/L     Narrative:      High Sensitive Troponin T Reference Range:  <14.0 ng/L- Negative Female for AMI  <22.0 ng/L- Negative Male for AMI  >=14 - Abnormal Female indicating possible myocardial injury.  >=22 - Abnormal Male indicating possible myocardial injury.   Clinicians would have to utilize clinical acumen, EKG, Troponin, and serial changes to determine if it is an Acute Myocardial Infarction or myocardial injury due to an underlying chronic condition.         Comprehensive Metabolic Panel [[id number]]  (Abnormal) Collected: 07/29/24 0122    Specimen: Blood Updated: 07/29/24 0217     Glucose 187 mg/dL      Comment: Glucose >180, Hemoglobin A1C recommended.        BUN 15 mg/dL      Creatinine 0.83 mg/dL      Sodium 137 mmol/L      Potassium 4.4 mmol/L      Chloride 101 mmol/L      CO2 21.6 mmol/L      Calcium 9.6 mg/dL      Total Protein 7.7 g/dL      Albumin 4.4 g/dL      ALT  (SGPT) 14 U/L      AST (SGOT) 12 U/L      Alkaline Phosphatase 80 U/L      Total Bilirubin 0.6 mg/dL      Globulin 3.3 gm/dL      A/G Ratio 1.3 g/dL      BUN/Creatinine Ratio 18.1     Anion Gap 14.4 mmol/L      eGFR 89.3 mL/min/1.73     Narrative:      GFR Normal >60  Chronic Kidney Disease <60  Kidney Failure <15      Lipase [[id number]]  (Normal) Collected: 07/29/24 0122    Specimen: Blood Updated: 07/29/24 0217     Lipase 26 U/L     [place] Draw [[id number]] Collected: 07/29/24 0122    Specimen: Blood Updated: 07/29/24 0215    Narrative:      The following orders were created for panel order [place] Draw.  Procedure                               Abnormality         Status                     ---------                               -----------         ------                     Green Top (Gel)[[id number]]                                  Final result               Lavender Top[[id number]]                                     Final result               Gold Top - SST[[id number]]                                   Final result               Light Blue Top[[id number]]                                   Final result                 Please view results for these tests on the individual orders.    Gold Top - SST [[id number]] Collected: 07/29/24 0122    Specimen: Blood Updated: 07/29/24 0215     Extra Tube Hold for add-ons.     Comment: Auto resulted.       Green Top (Gel) [[id number]] Collected: 07/29/24 0122    Specimen: Blood Updated: 07/29/24 0201     Extra Tube Hold for add-ons.     Comment: Auto resulted.       Lavender Top [[id number]] Collected: 07/29/24 0122    Specimen: Blood Updated: 07/29/24 0201     Extra Tube hold for add-on     Comment: Auto resulted       Light Blue Top [[id number]] Collected: 07/29/24 0122    Specimen: Blood Updated: 07/29/24 0201     Extra Tube Hold for add-ons.     Comment: Auto resulted       CBC & Differential [[id number]]  (Abnormal) Collected: 07/29/24 0122    Specimen: Blood Updated: 07/29/24  0158    Narrative:      The following orders were created for panel order CBC & Differential.  Procedure                               Abnormality         Status                     ---------                               -----------         ------                     CBC Auto Differential[[id number]]        Abnormal            Final result                 Please view results for these tests on the individual orders.    CBC Auto Differential [[id number]]  (Abnormal) Collected: 07/29/24 0122    Specimen: Blood Updated: 07/29/24 0158     WBC 19.42 10*3/mm3      RBC 4.60 10*6/mm3      Hemoglobin 13.0 g/dL      Hematocrit 39.4 %      MCV 85.7 fL      MCH 28.3 pg      MCHC 33.0 g/dL      RDW 12.9 %      RDW-SD 39.8 fl      MPV 9.1 fL      Platelets 354 10*3/mm3      Neutrophil % 88.4 %      Lymphocyte % 7.0 %      Monocyte % 3.3 %      Eosinophil % 0.5 %      Basophil % 0.4 %      Immature Grans % 0.4 %      Neutrophils, Absolute 17.19 10*3/mm3      Lymphocytes, Absolute 1.35 10*3/mm3      Monocytes, Absolute 0.64 10*3/mm3      Eosinophils, Absolute 0.09 10*3/mm3      Basophils, Absolute 0.07 10*3/mm3      Immature Grans, Absolute 0.08 10*3/mm3      nRBC 0.0 /100 WBC                             ASSESSMENT/PLAN:      Acute cholecystitis    Ms. Snell is a 45 yo female patient admitted early this morning with an emergency department workup consistent with acute calculous cholecystitis.  I recommended to the patient that we proceed with laparoscopic cholecystectomy for definitive management of symptoms related to underlying gallbladder disease.  We discussed the laparoscopic cholecystectomy procedure in detail along with the risks, benefits, and alternatives.  We specifically discussed the risks of bleeding, infection, conversion to an open procedure, postoperative bile leak, common bile duct injury, the need for ERCP (in the setting of bile leak, choledocholithiasis, etc.), and the risks related to anesthesia.  The patient  understands these, and is willing to proceed.  She will continue to receive IV antibiotics and has been added on to the OR schedule for tomorrow morning.  She may have clear liquids until midnight.  She will then be n.p.o. after midnight.    Reena Cao MD  07/29/24  07:23 EDT

## 2024-07-29 NOTE — PAYOR COMM NOTE
"OBSERVATION NOTIFICATION    To:  Danay  From: Mike Armstrong RN  Phone: [phone]  Fax: [phone]  NPI: [id number]  TIN: [id number]      Linda Snell May (44 y.o. Female)       Date of Birth   1980    Social Security Number       Address   [address] [place] [address]    Home Phone   [phone]    MRN   [id number]       Protestant   None    Marital Status                               Admission Date   7/29/24    Admission Type   Emergency    Admitting Provider   Reena Cao MD    Attending Provider   Reena Cao MD    Department, Room/Bed   [facility] OB GYN, W210/1       Discharge Date       Discharge Disposition       Discharge Destination                                 Attending Provider: Reena Cao MD    Allergies: Propylthiouracil    Isolation: None   Infection: None   Code Status: CPR    Ht: 152.4 cm (60\")   Wt: 89.8 kg (198 lb)    Admission Cmt: None   Principal Problem: Acute cholecystitis [K81.0]                   Active Insurance as of 7/29/2024       Primary Coverage       Payor Plan Insurance Group Employer/Plan Group    [facility] [employer] [facility] [employer] BL[employer] PPO [id number]       Payor Plan Address Payor Plan Phone Number Payor Plan Fax Number Effective Dates    [address] [phone]  6/1/2024 - None Entered    [place] [address]         Subscriber Name Subscriber Birth Date Member ID       LINDA SNELL MAY 1980 [id number]                     Emergency Contacts        (Rel.) Home Phone Work Phone Mobile Phone    MARY CAMARENANT (Spouse) [phone] -- [phone]              History & Physical    No notes of this type exist for this encounter.       Vital Signs (last 2 days)       Date/Time Temp Temp src Pulse Resp BP Patient Position SpO2    07/29/24 0817 98.1 (36.7) Oral 70 18 143/87 Lying 98    07/29/24 06:38:32 -- -- -- 16 -- -- --    07/29/24 0630 -- -- 68 -- 149/99 -- 96    07/29/24 0600 " -- -- 63 -- 147/84 -- 96    07/29/24 0500 -- -- 60 -- 147/92 -- 97    07/29/24 0430 -- -- 75 -- 143/88 -- 98    07/29/24 0420 -- -- 70 -- -- -- 88    07/29/24 0400 -- -- 58 -- 144/88 -- 93    07/29/24 0332 -- -- 85 18 129/82 -- 93    07/29/24 0312 -- -- 61 20 122/81 -- 95    07/29/24 0230 -- -- 62 -- 118/72 -- 95    07/29/24 0122 -- -- 62 20 127/71 -- 97    07/29/24 0117 98.1 (36.7) Oral 64 20 140/75 Sitting 98          Facility-Administered Medications as of 7/29/2024   Medication Dose Route Frequency Provider Last Rate Last Admin    [COMPLETED] aluminum-magnesium hydroxide-simethicone (MAALOX MAX) 400-400-40 MG/5ML suspension 15 mL  15 mL Oral Once Yariel Guerra MD   15 mL at 07/29/24 0213    heparin (porcine) 5000 UNIT/ML injection 5,000 Units  5,000 Units Subcutaneous Q8H Reena Cao MD   5,000 Units at 07/29/24 0855    [COMPLETED] HYDROmorphone (DILAUDID) injection 0.5 mg  0.5 mg Intravenous Once Yariel Guerra MD   0.5 mg at 07/29/24 0413    HYDROmorphone (DILAUDID) injection 0.5 mg  0.5 mg Intravenous Q2H PRN Reena Cao MD   0.5 mg at 07/29/24 1313    And    naloxone (NARCAN) injection 0.4 mg  0.4 mg Intravenous Q5 Min PRN Reena Cao MD        [COMPLETED] iopamidol (ISOVUE-300) 61 % injection 100 mL  100 mL Intravenous Once in imaging Yariel Guerra MD   100 mL at 07/29/24 0302    lactated ringers infusion  100 mL/hr Intravenous Continuous Reena Cao  mL/hr at 07/29/24 0854 100 mL/hr at 07/29/24 0854    [COMPLETED] Lidocaine Viscous HCl (XYLOCAINE) 2 % solution 5 mL  5 mL Mouth/Throat Once Yariel Guerra MD   5 mL at 07/29/24 0213    [COMPLETED] morphine injection 4 mg  4 mg Intravenous Once Yariel Guerra MD   4 mg at 07/29/24 0225    [COMPLETED] ondansetron (ZOFRAN) injection 4 mg  4 mg Intravenous Once Yariel Guerra MD   4 mg at 07/29/24 0158    [COMPLETED] ondansetron (ZOFRAN) injection 4 mg  4 mg Intravenous Once Yariel Guerra MD   4 mg at 07/29/24 0412     ondansetron (ZOFRAN) injection 4 mg  4 mg Intravenous Q6H PRN Reena Cao MD   4 mg at 07/29/24 1058    [COMPLETED] piperacillin-tazobactam (ZOSYN) IVPB 3.375 g IVPB in 100 mL NS (VTB)  3.375 g Intravenous Once Yariel Guerra MD   3.375 g at 07/29/24 0637    [COMPLETED] sodium chloride 0.9 % bolus 1,000 mL  1,000 mL Intravenous Once Yariel Guerra MD 2,000 mL/hr at 07/29/24 0635 1,000 mL at 07/29/24 0635    [COMPLETED] sodium chloride 0.9 % bolus 500 mL  500 mL Intravenous Once Yariel Guerra MD   Stopped at 07/29/24 0414    sodium chloride 0.9 % flush 10 mL  10 mL Intravenous PRN Yariel Guerra MD        sodium chloride 0.9 % flush 10 mL  10 mL Intravenous Q12H Reena Cao MD        sodium chloride 0.9 % flush 10 mL  10 mL Intravenous PRN Reena Cao MD        sodium chloride 0.9 % infusion 40 mL  40 mL Intravenous PRN Reena Cao MD         Lab Results (last 48 hours)       Procedure Component Value Units Date/Time    Urinalysis, Microscopic Only - Urine, Clean Catch [[id number]]  (Abnormal) Collected: 07/29/24 0311    Specimen: Urine, Clean Catch Updated: 07/29/24 0323     RBC, UA None Seen /HPF      WBC, UA 0-2 /HPF      Bacteria, UA Trace /HPF      Squamous Epithelial Cells, UA 0-2 /HPF      Hyaline Casts, UA None Seen /LPF      Mucus, UA Trace /HPF      Methodology Manual Light Microscopy    Urinalysis With Microscopic If Indicated (No Culture) - Urine, Clean Catch [[id number]]  (Abnormal) Collected: 07/29/24 0311    Specimen: Urine, Clean Catch Updated: 07/29/24 0320     Color, UA Yellow     Appearance, UA Clear     pH, UA <=5.0     Specific Gravity, UA >1.030     Glucose, UA Negative     Ketones, UA 15 mg/dL (1+)     Bilirubin, UA Negative     Blood, UA Small (1+)     Protein, UA 30 mg/dL (1+)     Leuk Esterase, UA Negative     Nitrite, UA Negative     Urobilinogen, UA 0.2 E.U./dL    Single High Sensitivity Troponin T [[id number]]  (Normal) Collected: 07/29/24 0122     Specimen: Blood Updated: 07/29/24 0219     HS Troponin T <6 ng/L     Narrative:      High Sensitive Troponin T Reference Range:  <14.0 ng/L- Negative Female for AMI  <22.0 ng/L- Negative Male for AMI  >=14 - Abnormal Female indicating possible myocardial injury.  >=22 - Abnormal Male indicating possible myocardial injury.   Clinicians would have to utilize clinical acumen, EKG, Troponin, and serial changes to determine if it is an Acute Myocardial Infarction or myocardial injury due to an underlying chronic condition.         Comprehensive Metabolic Panel [[id number]]  (Abnormal) Collected: 07/29/24 0122    Specimen: Blood Updated: 07/29/24 0217     Glucose 187 mg/dL      Comment: Glucose >180, Hemoglobin A1C recommended.        BUN 15 mg/dL      Creatinine 0.83 mg/dL      Sodium 137 mmol/L      Potassium 4.4 mmol/L      Chloride 101 mmol/L      CO2 21.6 mmol/L      Calcium 9.6 mg/dL      Total Protein 7.7 g/dL      Albumin 4.4 g/dL      ALT (SGPT) 14 U/L      AST (SGOT) 12 U/L      Alkaline Phosphatase 80 U/L      Total Bilirubin 0.6 mg/dL      Globulin 3.3 gm/dL      A/G Ratio 1.3 g/dL      BUN/Creatinine Ratio 18.1     Anion Gap 14.4 mmol/L      eGFR 89.3 mL/min/1.73     Narrative:      GFR Normal >60  Chronic Kidney Disease <60  Kidney Failure <15      Lipase [[id number]]  (Normal) Collected: 07/29/24 0122    Specimen: Blood Updated: 07/29/24 0217     Lipase 26 U/L     [place] Draw [[id number]] Collected: 07/29/24 0122    Specimen: Blood Updated: 07/29/24 0215    Narrative:      The following orders were created for panel order [place] Draw.  Procedure                               Abnormality         Status                     ---------                               -----------         ------                     Green Top (Gel)[[id number]]                                  Final result               Lavender Top[[id number]]                                     Final result               Gold Top - SST[[id number]]                                    Final result               Light Blue Top[[id number]]                                   Final result                 Please view results for these tests on the individual orders.    Gold Top - SST [[id number]] Collected: 07/29/24 0122    Specimen: Blood Updated: 07/29/24 0215     Extra Tube Hold for add-ons.     Comment: Auto resulted.       Green Top (Gel) [[id number]] Collected: 07/29/24 0122    Specimen: Blood Updated: 07/29/24 0201     Extra Tube Hold for add-ons.     Comment: Auto resulted.       Lavender Top [[id number]] Collected: 07/29/24 0122    Specimen: Blood Updated: 07/29/24 0201     Extra Tube hold for add-on     Comment: Auto resulted       Light Blue Top [[id number]] Collected: 07/29/24 0122    Specimen: Blood Updated: 07/29/24 0201     Extra Tube Hold for add-ons.     Comment: Auto resulted       CBC & Differential [[id number]]  (Abnormal) Collected: 07/29/24 0122    Specimen: Blood Updated: 07/29/24 0158    Narrative:      The following orders were created for panel order CBC & Differential.  Procedure                               Abnormality         Status                     ---------                               -----------         ------                     CBC Auto Differential[[id number]]        Abnormal            Final result                 Please view results for these tests on the individual orders.    CBC Auto Differential [[id number]]  (Abnormal) Collected: 07/29/24 0122    Specimen: Blood Updated: 07/29/24 0158     WBC 19.42 10*3/mm3      RBC 4.60 10*6/mm3      Hemoglobin 13.0 g/dL      Hematocrit 39.4 %      MCV 85.7 fL      MCH 28.3 pg      MCHC 33.0 g/dL      RDW 12.9 %      RDW-SD 39.8 fl      MPV 9.1 fL      Platelets 354 10*3/mm3      Neutrophil % 88.4 %      Lymphocyte % 7.0 %      Monocyte % 3.3 %      Eosinophil % 0.5 %      Basophil % 0.4 %      Immature Grans % 0.4 %      Neutrophils, Absolute 17.19 10*3/mm3      Lymphocytes, Absolute 1.35 10*3/mm3       Monocytes, Absolute 0.64 10*3/mm3      Eosinophils, Absolute 0.09 10*3/mm3      Basophils, Absolute 0.07 10*3/mm3      Immature Grans, Absolute 0.08 10*3/mm3      nRBC 0.0 /100 WBC           Imaging Results (Last 48 Hours)       Procedure Component Value Units Date/Time    US Gallbladder [[id number]] Collected: 07/29/24 0617     Updated: 07/29/24 0618    Narrative:      FINAL REPORT    TECHNIQUE:  null    CLINICAL HISTORY:  Abdominal pain, leukocytosis, distended gallbladder    COMPARISON:  null    FINDINGS:  US abdomen limited    Comparison: CT/SR - CT ABDOMEN PELVIS W CONTRAST - 07/29/2024 02:32 AM EDT    Findings:    The visualized pancreas is normal.    The liver is normal in size and echotexture. The liver measures 14.8 cm.    There is no intrahepatic bile duct dilatation.    The common duct is 4-5 mm in diameter.    The gallbladder is distended measuring up to 10 cm in length. Echogenic cholelithiasis and sludge within the lumen of the gallbladder. The gallbladder wall measures 2 mm.    The main portal vein is antegrade.    The right kidney is normal in size and echotexture, 9.5 cm in length    No ascites.      Impression:      IMPRESSION:    1. A distended gallbladder measuring up to 10 cm in length. Echogenic cholelithiasis and sludge within the lumen of the gallbladder. If high clinical concern for cholecystitis recommend nuclear medicine HIDA scan.    2. Normal common bile duct measuring 4-5 mm in diameter.    Authenticated and Electronically Signed by Blaze Hawkins DO on  07/29/2024 06:17:02 AM    CT Abdomen Pelvis With Contrast [[id number]] Collected: 07/29/24 0406     Updated: 07/29/24 0408    Narrative:      FINAL REPORT    TECHNIQUE:  null    CLINICAL HISTORY:  Epigastric pain    COMPARISON:  null    FINDINGS:  CT abdomen and pelvis with contrast    Comparison: None    Findings:    Lung bases are clear. No acute bony abnormalities.    Liver and spleen within normal limits.    Pancreas and adrenal  "glands unremarkable.    Cholelithiasis with gallbladder distention.    Recommend follow-up gallbladder ultrasound.    Bilateral kidneys demonstrate no focal abnormality.    No renal stones or hydronephrosis.    Abdominal aorta is normal in caliber.    No free fluid or adenopathy in the pelvis.    No acute diverticulitis. Appendix unremarkable.    4.4 cm right uterine fibroid noted.    3 cm left ovarian cyst.      Impression:      Impression:    Cholelithiasis with gallbladder distention    Recommend follow-up ultrasound    Authenticated and Electronically Signed by Santana San MD on  07/29/2024 04:06:58 AM          Orders (last 48 hrs)        Start     Ordered    07/29/24 0946  Case Request  Once         07/29/24 0947    07/29/24 0946  Follow Anesthesia Guidelines / Protocol  Continuous         07/29/24 0947    07/29/24 0946  Perform Chlorhexidine Skin Prep Night Before and Morning of Procedure  Until Discontinued        Comments: Chlorhexidine Skin Prep and Instructions For All Patients Having A Procedure Requiring an Outward Incision if Not Allergic. If Allergic, Give Antibacterial Skin Wipes and Instructions. Do Not Use For Facial Cases or on Any Mucus Membranes.    07/29/24 0947    07/29/24 0900  sodium chloride 0.9 % flush 10 mL  Every 12 Hours Scheduled         07/29/24 0736    07/29/24 0830  heparin (porcine) 5000 UNIT/ML injection 5,000 Units  Every 8 Hours Scheduled         07/29/24 0736    07/29/24 0830  lactated ringers infusion  Continuous         07/29/24 0736    07/29/24 0800  Vital Signs  Every 4 Hours       07/29/24 0736    07/29/24 0800  Incentive Spirometry  Every 2 Hours While Awake       07/29/24 0736    07/29/24 0723  Bowel Regimen Not Indicated  Once         07/29/24 0736    07/29/24 0722  HYDROmorphone (DILAUDID) injection 0.5 mg  Every 2 Hours PRN        Placed in \"And\" Linked Group    07/29/24 0736    07/29/24 0722  naloxone (NARCAN) injection 0.4 mg  Every 5 Minutes PRN        Placed in " "\"And\" Linked Group    07/29/24 0736    07/29/24 0722  ondansetron (ZOFRAN) injection 4 mg  Every 6 Hours PRN         07/29/24 0736    07/29/24 0722  Code Status and Medical Interventions: CPR (Attempt to Resuscitate); Full Support  Continuous         07/29/24 0736    07/29/24 0722  Activity - Ad Jeannine  Until Discontinued         07/29/24 0736    07/29/24 0722  Notify Provider (With Default Parameters)  Continuous        Comments: Open Order Report to View Parameters Requiring Provider Notification    07/29/24 0736    07/29/24 0722  NPO Diet NPO Type: Strict NPO  Diet Effective Now         07/29/24 0736    07/29/24 0721  Intake & Output  Every Shift       07/29/24 0736    07/29/24 0721  Weigh Patient  Once         07/29/24 0736    07/29/24 0721  Insert Peripheral IV  Once         07/29/24 0736    07/29/24 0721  Saline Lock & Maintain IV Access  Continuous         07/29/24 0736    07/29/24 0721  sodium chloride 0.9 % infusion 40 mL  As Needed         07/29/24 0736    07/29/24 0720  sodium chloride 0.9 % flush 10 mL  As Needed         07/29/24 0736    07/29/24 0639  piperacillin-tazobactam (ZOSYN) IVPB 3.375 g IVPB in 100 mL NS (VTB)  Once         07/29/24 0623    07/29/24 0639  sodium chloride 0.9 % bolus 1,000 mL  Once         07/29/24 0623    07/29/24 0627  NPO Diet NPO Type: Strict NPO  Diet Effective Now,   Status:  Canceled         07/29/24 0626    07/29/24 0625  Initiate Observation Status  Once         07/29/24 0625    07/29/24 0418  US Gallbladder  1 Time Imaging         07/29/24 0417    07/29/24 0414  HYDROmorphone (DILAUDID) injection 0.5 mg  Once         07/29/24 0358    07/29/24 0414  ondansetron (ZOFRAN) injection 4 mg  Once         07/29/24 0358    07/29/24 0318  iopamidol (ISOVUE-300) 61 % injection 100 mL  Once in Imaging         07/29/24 0302    07/29/24 0318  Urinalysis, Microscopic Only - Urine, Clean Catch  Once         07/29/24 0317    07/29/24 0233  morphine injection 4 mg  Once         07/29/24 " 0217    07/29/24 0219  CT Abdomen Pelvis With Contrast  1 Time Imaging        Comments: IV CONTRAST ONLY      07/29/24 0218    07/29/24 0148  sodium chloride 0.9 % bolus 500 mL  Once         07/29/24 0132    07/29/24 0148  aluminum-magnesium hydroxide-simethicone (MAALOX MAX) 400-400-40 MG/5ML suspension 15 mL  Once         07/29/24 0132    07/29/24 0148  ondansetron (ZOFRAN) injection 4 mg  Once         07/29/24 0132    07/29/24 0148  Lidocaine Viscous HCl (XYLOCAINE) 2 % solution 5 mL  Once         07/29/24 0132    07/29/24 0122  NPO Diet NPO Type: Strict NPO  Diet Effective Now,   Status:  Canceled         07/29/24 0121    07/29/24 0122  Undress & Gown  Once         07/29/24 0121    07/29/24 0122  Cardiac Monitoring  Continuous,   Status:  Canceled        Comments: Follow Standing Orders As Outlined in Process Instructions (Open Order Report to View Full Instructions)    07/29/24 0121    07/29/24 0122  Continuous Pulse Oximetry  Continuous         07/29/24 0121    07/29/24 0122  Vital Signs  Per Hospital Policy         07/29/24 0121    07/29/24 0122  ECG 12 Lead ED Triage Standing Order; Abdominal Pain (Upper)  Once         07/29/24 0121    07/29/24 0122  Insert Peripheral IV  Once         07/29/24 0121    07/29/24 0122  [place] Draw  Once         07/29/24 0121    07/29/24 0122  CBC & Differential  Once         07/29/24 0121    07/29/24 0122  Comprehensive Metabolic Panel  Once         07/29/24 0121    07/29/24 0122  Lipase  Once         07/29/24 0121    07/29/24 0122  Single High Sensitivity Troponin T  Once         07/29/24 0121    07/29/24 0122  Urinalysis With Microscopic If Indicated (No Culture) - Urine, Clean Catch  Once         07/29/24 0121    07/29/24 0122  Green Top (Gel)  PROCEDURE ONCE         07/29/24 0121    07/29/24 0122  Lavender Top  PROCEDURE ONCE         07/29/24 0121    07/29/24 0122  Gold Top - SST  PROCEDURE ONCE         07/29/24 0121    07/29/24 0122  Light Blue Top  PROCEDURE ONCE          07/29/24 0121    07/29/24 0122  CBC Auto Differential  PROCEDURE ONCE         07/29/24 0121    07/29/24 0121  Oxygen Therapy- Nasal Cannula; Titrate 1-6 LPM Per SpO2; 90 - 95%  Continuous PRN,   Status:  Canceled       07/29/24 0121    07/29/24 0121  sodium chloride 0.9 % flush 10 mL  As Needed         07/29/24 0121    --  dexlansoprazole (Dexilant) 60 MG capsule  Daily         07/29/24 0145    Signed and Held  Follow Anesthesia Guidelines / Protocol  Once         Signed and Held    Signed and Held  Verify NPO Status  Continuous         Signed and Held    Signed and Held  Clip Operative Site  Once         Signed and Held    Signed and Held  Verify / Perform Chlorhexidine Skin Prep  Once        Comments: Chlorhexidine Skin Wipes and Instructions For All Patients Having A Procedure Requiring an Outward Incision if Not Allergic.  If Allergic, Give Antibacterial Skin Wipes and Instructions.  Do Not Use For Facial Cases or on Any Mucus Membranes.    Signed and Held    Signed and Held  Insert Peripheral IV  Once         Signed and Held    Signed and Held  Saline Lock & Maintain IV Access  Continuous         Signed and Held    Signed and Held  sodium chloride 0.9 % flush 10 mL  Every 12 Hours Scheduled         Signed and Held    Signed and Held  sodium chloride 0.9 % flush 10 mL  As Needed         Signed and Held    Signed and Held  sodium chloride 0.9 % infusion 40 mL  As Needed         Signed and Held    Signed and Held  lactated ringers infusion  Continuous         Signed and Held    Signed and Held  Place Sequential Compression Device  Once         Signed and Held    Signed and Held  Maintain Sequential Compression Device  Continuous         Signed and Held    Signed and Held  Obtain Informed Consent  Once         Signed and Held    Signed and Held  ceFAZolin 2000 mg IVPB in 100 mL NS (VTB)  Once         Signed and Held                  Physician Progress Notes (last 48 hours)  Notes from 07/27/24 1339 through 07/29/24  1339   No notes of this type exist for this encounter.       Consult Notes (last 48 hours)  Notes from 07/27/24 1339 through 07/29/24 1339   No notes of this type exist for this encounter.

## 2024-07-29 NOTE — CASE MANAGEMENT/SOCIAL WORK
Discharge Planning Assessment   [place]     Patient Name: Linda Snell  MRN: [id number]  Today's Date: 7/29/2024    Admit Date: 7/29/2024    Plan: Met with pt for dcp, she provided demographics. She does not have an AD or POA. No home DME or financial stressors. [facility] is her primary and [facility] is preferred pharmacy, declines meds to bed. She and her spouse have been at current address for 5 yrs. Pt drives, works full time, is independent. She plans to return home when discharged.   Discharge Needs Assessment       Row Name 07/29/24 1200       Living Environment    People in Home spouse    Current Living Arrangements home    Duration at Residence 5 yrs    Potentially Unsafe Housing Conditions none    In the past 12 months has the electric, gas, oil, or water company threatened to shut off services in your home? No    Primary Care Provided by self    Family Caregiver if Needed spouse    Quality of Family Relationships helpful;involved    Able to Return to Prior Arrangements yes       Resource/Environmental Concerns    Resource/Environmental Concerns none    Transportation Concerns none       Transportation Needs    In the past 12 months, has lack of transportation kept you from medical appointments or from getting medications? no    In the past 12 months, has lack of transportation kept you from meetings, work, or from getting things needed for daily living? No       Food Insecurity    Within the past 12 months, you worried that your food would run out before you got the money to buy more. Never true    Within the past 12 months, the food you bought just didn't last and you didn't have money to get more. Never true       Discharge Needs Assessment    Readmission Within the Last 30 Days no previous admission in last 30 days    Equipment Currently Used at Home none    Concerns to be Addressed denies needs/concerns at this time;no discharge needs identified    Equipment Needed After Discharge none                    Discharge Plan       Row Name 07/29/24 8343       Plan    Plan Met with pt for dcp, she provided demographics. She does not have an AD or POA. No home DME or financial stressors. [facility] is her primary and [facility] preferred [facility], declines meds to bed. She and her spouse have been at current address for 5 yrs. Pt drives, works full time, is independent. She plans to return home when discharged.                  Continued Care and Services - Admitted Since 7/29/2024    No active coordination exists for this encounter.          Demographic Summary       Row Name 07/29/24 1159       General Information    Admission Type observation    Arrived From emergency department    Referral Source admission list    Reason for Consult discharge planning    Preferred Language English       Contact Information    Permission Granted to Share Info With family/designee                   Functional Status       Row Name 07/29/24 1200       Functional Status    Usual Activity Tolerance good    Current Activity Tolerance good       Physical Activity    On average, how many days per week do you engage in moderate to strenuous exercise (like a brisk walk)? 3 days    On average, how many minutes do you engage in exercise at this level? 10 min    Number of minutes of exercise per week 30       Functional Status, IADL    Medications independent    Meal Preparation independent    Housekeeping independent    Laundry independent    Shopping independent       Mental Status    General Appearance WDL WDL       Mental Status Summary    Recent Changes in Mental Status/Cognitive Functioning no changes       Employment/    Employment Status employed full-time                   Psychosocial    No documentation.                  Abuse/Neglect    No documentation.                  Legal    No documentation.                  Substance Abuse    No documentation.                  Patient Forms    No documentation.                      Areli Monroy, RN

## 2024-07-29 NOTE — ED PROVIDER NOTES
" EMERGENCY DEPARTMENT ENCOUNTER    Pt Name: Linda Snell  MRN: [id number]  Pt :   1980  Room Number:    Date of encounter:  2024  PCP: Huma Steele APRN  ED Provider: Yariel Guerra MD    Historian: Patient      HPI:  Chief Complaint: \"GERD attack\"        Context: Linda Snell is a 44 y.o. female who presents to the ED c/o GERD attack.  Patient states that she has been having a \"GERD attack\" for the past 5 hours.  States that she had been off of medications for GERD for the past year or 2 and then started having episodes of reflux again so was started back on medication within the past month.  Had to be on sucralfate for 2 weeks around 1 month ago because of reflux flaring up so bad.  Tonight has had significant pain accompanied by multiple episodes of vomiting along with occasional diarrhea.  Reports pain is in her upper mid abdomen and radiates into her mid back.  Denies any chest pain or difficulty breathing.  Denies fever.  No other symptoms reported at this time.  Pain this time is more severe and not going away like her GERD used to.      PAST MEDICAL HISTORY  Past Medical History:   Diagnosis Date    GERD (gastroesophageal reflux disease)     Graefe's disease     Graves disease     Hypertension     Hypothyroidism     Obesity     body mass index 30+-Obesity--Abstracted from [employer]    Vitamin D deficiency          PAST SURGICAL HISTORY  Past Surgical History:   Procedure Laterality Date    PELVIC ABCESS DRAINAGE      WISDOM TOOTH EXTRACTION           FAMILY HISTORY  Family History   Problem Relation Age of Onset    No Known Problems Mother     Kidney failure Father     Hypertension Father     No Known Problems Brother     Cancer Maternal Aunt     Breast cancer Maternal Aunt         DX AGE UNKNOWN    Ovarian cancer Neg Hx          SOCIAL HISTORY  Social History     Socioeconomic History    Marital status:    Tobacco Use    Smoking status: Never    Smokeless tobacco: " Never   Vaping Use    Vaping status: Never Used   Substance and Sexual Activity    Alcohol use: Never    Drug use: Never    Sexual activity: Defer         ALLERGIES  Propylthiouracil        REVIEW OF SYSTEMS  Review of Systems     All systems reviewed and negative except for those discussed in HPI.       PHYSICAL EXAM    I have reviewed the triage vital signs and nursing notes.    ED Triage Vitals [07/29/24 0117]   Temp Heart Rate Resp BP SpO2   98.1 °F (36.7 °C) 64 20 140/75 98 %      Temp src Heart Rate Source Patient Position BP Location FiO2 (%)   Oral Monitor Sitting Left arm --       Physical Exam    General:  Awake, alert, overweight, no acute distress  HEENT: Atraumatic, normocephalic, EOMI, PERRLA, mucous membranes moist  NECK:  Supple, atraumatic  Cardiovascular:  Regular rate, regular rhythm, no murmurs, rubs, or gallops.  Extremities well perfused   Respiratory:  Regular rate, clear lungs to auscultation bilaterally.  No rhonchi, rales, wheezing  Abdominal:  Soft, nondistended, tenderness to palpation of epigastric.  No guarding or rebound.  No palpable masses  Extremity:  No visible bony abnormalities in all 4 extremities.  Full range of motion of all extremities.  Skin:  Warm and dry.  No rashes  Neuro:  AAOx3, GCS 15. Cranial nerves 2-12 grossly intact.  No focal strength or sensation deficits.  Psych:  Mood and affect appropriate.        LAB RESULTS  Recent Results (from the past 24 hour(s))   Comprehensive Metabolic Panel    Collection Time: 07/29/24  1:22 AM    Specimen: Blood   Result Value Ref Range    Glucose 187 (H) 65 - 99 mg/dL    BUN 15 6 - 20 mg/dL    Creatinine 0.83 0.57 - 1.00 mg/dL    Sodium 137 136 - 145 mmol/L    Potassium 4.4 3.5 - 5.2 mmol/L    Chloride 101 98 - 107 mmol/L    CO2 21.6 (L) 22.0 - 29.0 mmol/L    Calcium 9.6 8.6 - 10.5 mg/dL    Total Protein 7.7 6.0 - 8.5 g/dL    Albumin 4.4 3.5 - 5.2 g/dL    ALT (SGPT) 14 1 - 33 U/L    AST (SGOT) 12 1 - 32 U/L    Alkaline Phosphatase  80 39 - 117 U/L    Total Bilirubin 0.6 0.0 - 1.2 mg/dL    Globulin 3.3 gm/dL    A/G Ratio 1.3 g/dL    BUN/Creatinine Ratio 18.1 7.0 - 25.0    Anion Gap 14.4 5.0 - 15.0 mmol/L    eGFR 89.3 >60.0 mL/min/1.73   Lipase    Collection Time: 07/29/24  1:22 AM    Specimen: Blood   Result Value Ref Range    Lipase 26 13 - 60 U/L   Single High Sensitivity Troponin T    Collection Time: 07/29/24  1:22 AM    Specimen: Blood   Result Value Ref Range    HS Troponin T <6 <14 ng/L   Green Top (Gel)    Collection Time: 07/29/24  1:22 AM   Result Value Ref Range    Extra Tube Hold for add-ons.    Lavender Top    Collection Time: 07/29/24  1:22 AM   Result Value Ref Range    Extra Tube hold for add-on    Gold Top - SST    Collection Time: 07/29/24  1:22 AM   Result Value Ref Range    Extra Tube Hold for add-ons.    Light Blue Top    Collection Time: 07/29/24  1:22 AM   Result Value Ref Range    Extra Tube Hold for add-ons.    CBC Auto Differential    Collection Time: 07/29/24  1:22 AM    Specimen: Blood   Result Value Ref Range    WBC 19.42 (H) 3.40 - 10.80 10*3/mm3    RBC 4.60 3.77 - 5.28 10*6/mm3    Hemoglobin 13.0 12.0 - 15.9 g/dL    Hematocrit 39.4 34.0 - 46.6 %    MCV 85.7 79.0 - 97.0 fL    MCH 28.3 26.6 - 33.0 pg    MCHC 33.0 31.5 - 35.7 g/dL    RDW 12.9 12.3 - 15.4 %    RDW-SD 39.8 37.0 - 54.0 fl    MPV 9.1 6.0 - 12.0 fL    Platelets 354 140 - 450 10*3/mm3    Neutrophil % 88.4 (H) 42.7 - 76.0 %    Lymphocyte % 7.0 (L) 19.6 - 45.3 %    Monocyte % 3.3 (L) 5.0 - 12.0 %    Eosinophil % 0.5 0.3 - 6.2 %    Basophil % 0.4 0.0 - 1.5 %    Immature Grans % 0.4 0.0 - 0.5 %    Neutrophils, Absolute 17.19 (H) 1.70 - 7.00 10*3/mm3    Lymphocytes, Absolute 1.35 0.70 - 3.10 10*3/mm3    Monocytes, Absolute 0.64 0.10 - 0.90 10*3/mm3    Eosinophils, Absolute 0.09 0.00 - 0.40 10*3/mm3    Basophils, Absolute 0.07 0.00 - 0.20 10*3/mm3    Immature Grans, Absolute 0.08 (H) 0.00 - 0.05 10*3/mm3    nRBC 0.0 0.0 - 0.2 /100 WBC   Urinalysis With  Microscopic If Indicated (No Culture) - Urine, Clean Catch    Collection Time: 07/29/24  3:11 AM    Specimen: Urine, Clean Catch   Result Value Ref Range    Color, UA Yellow Yellow, Straw    Appearance, UA Clear Clear    pH, UA <=5.0 5.0 - 8.0    Specific Gravity, UA >1.030 (H) 1.005 - 1.030    Glucose, UA Negative Negative    Ketones, UA 15 mg/dL (1+) (A) Negative    Bilirubin, UA Negative Negative    Blood, UA Small (1+) (A) Negative    Protein, UA 30 mg/dL (1+) (A) Negative    Leuk Esterase, UA Negative Negative    Nitrite, UA Negative Negative    Urobilinogen, UA 0.2 E.U./dL 0.2 - 1.0 E.U./dL   Urinalysis, Microscopic Only - Urine, Clean Catch    Collection Time: 07/29/24  3:11 AM    Specimen: Urine, Clean Catch   Result Value Ref Range    RBC, UA None Seen None Seen, 0-2 /HPF    WBC, UA 0-2 None Seen, 0-2 /HPF    Bacteria, UA Trace (A) None Seen /HPF    Squamous Epithelial Cells, UA 0-2 None Seen, 0-2 /HPF    Hyaline Casts, UA None Seen None Seen /LPF    Mucus, UA Trace None Seen, Trace /HPF    Methodology Manual Light Microscopy        If labs were ordered, I independently reviewed the results and considered them in treating the patient.        RADIOLOGY  US Gallbladder    Result Date: 7/29/2024  FINAL REPORT TECHNIQUE: null CLINICAL HISTORY: Abdominal pain, leukocytosis, distended gallbladder COMPARISON: null FINDINGS: US abdomen limited Comparison: CT/SR - CT ABDOMEN PELVIS W CONTRAST - 07/29/2024 02:32 AM EDT Findings: The visualized pancreas is normal. The liver is normal in size and echotexture. The liver measures 14.8 cm. There is no intrahepatic bile duct dilatation. The common duct is 4-5 mm in diameter. The gallbladder is distended measuring up to 10 cm in length. Echogenic cholelithiasis and sludge within the lumen of the gallbladder. The gallbladder wall measures 2 mm. The main portal vein is antegrade. The right kidney is normal in size and echotexture, 9.5 cm in length No ascites.     IMPRESSION:  1. A distended gallbladder measuring up to 10 cm in length. Echogenic cholelithiasis and sludge within the lumen of the gallbladder. If high clinical concern for cholecystitis recommend nuclear medicine HIDA scan. 2. Normal common bile duct measuring 4-5 mm in diameter. Authenticated and Electronically Signed by Blaze Hawkins DO on 07/29/2024 06:17:02 AM    CT Abdomen Pelvis With Contrast    Result Date: 7/29/2024  FINAL REPORT TECHNIQUE: null CLINICAL HISTORY: Epigastric pain COMPARISON: null FINDINGS: CT abdomen and pelvis with contrast Comparison: None Findings: Lung bases are clear. No acute bony abnormalities. Liver and spleen within normal limits. Pancreas and adrenal glands unremarkable. Cholelithiasis with gallbladder distention. Recommend follow-up gallbladder ultrasound. Bilateral kidneys demonstrate no focal abnormality. No renal stones or hydronephrosis. Abdominal aorta is normal in caliber. No free fluid or adenopathy in the pelvis. No acute diverticulitis. Appendix unremarkable. 4.4 cm right uterine fibroid noted. 3 cm left ovarian cyst.     Impression: Cholelithiasis with gallbladder distention Recommend follow-up ultrasound Authenticated and Electronically Signed by Santana San MD on 07/29/2024 04:06:58 AM     I ordered and independently reviewed the above noted radiographic studies.     See radiologist's dictation for official interpretation.        PROCEDURES    Procedures    ECG 12 Lead ED Triage Standing Order; Abdominal Pain (Upper)   Final Result          MEDICATIONS GIVEN IN ER    Medications   sodium chloride 0.9 % flush 10 mL (has no administration in time range)   piperacillin-tazobactam (ZOSYN) IVPB 3.375 g IVPB in 100 mL NS (VTB) (has no administration in time range)   sodium chloride 0.9 % bolus 1,000 mL (has no administration in time range)   sodium chloride 0.9 % bolus 500 mL (0 mL Intravenous Stopped 7/29/24 0414)   aluminum-magnesium hydroxide-simethicone (MAALOX MAX) 400-400-40  "MG/5ML suspension 15 mL (15 mL Oral Given 7/29/24 0213)   ondansetron (ZOFRAN) injection 4 mg (4 mg Intravenous Given 7/29/24 0158)   Lidocaine Viscous HCl (XYLOCAINE) 2 % solution 5 mL (5 mL Mouth/Throat Given 7/29/24 0213)   morphine injection 4 mg (4 mg Intravenous Given 7/29/24 0225)   iopamidol (ISOVUE-300) 61 % injection 100 mL (100 mL Intravenous Given 7/29/24 0302)   HYDROmorphone (DILAUDID) injection 0.5 mg (0.5 mg Intravenous Given 7/29/24 0413)   ondansetron (ZOFRAN) injection 4 mg (4 mg Intravenous Given 7/29/24 0412)         MEDICAL DECISION MAKING, PROGRESS, and CONSULTS    All labs, if obtained, have been independently reviewed by me.  All radiology studies, if obtained, have been reviewed by me and the radiologist dictating the report.  All EKG's, if obtained, have been independently viewed and interpreted by me.      Discussion below represents my analysis of pertinent findings related to patient's condition, differential diagnosis, treatment plan and final disposition.    Linda Snell is a 44 y.o. female who presents to the ED c/o \"GERD attack\".  Hemodynamically stable nontoxic appearance upon arrival.  Differential includes was not limited to GERD, gastritis, viral gastroenteritis, pancreatitis, cholecystitis.  Labs obtained along with EKG.  EKG personally interpreted showing normal sinus rhythm with rate of 66 but no evidence of acute ischemic changes or significant axis deviation.    Patient given 4 mg of IV Zofran for nausea.  Patient given bolus of normal saline IV fluids.  Patient also given GI cocktail viscous lidocaine and Maalox.  No significant pain change following GI cocktail also patient was given IV morphine with improvement.    Labs personally interpreted showing leukocytosis of 19.4.  Largely normal electrolyte panel and liver enzymes.  Urinalysis negative for evidence of significant UTI.  High-sensitivity troponin less than 6 with onset greater than 3 hours ago of abdominal " pain, no indication for repeat by high-sensitivity troponin protocol.  CT of abdomen pelvis shows evidence of cholelithiasis with gallbladder distention and ultrasound was recommended by radiology.    Pain started to recur again and was 10 out of 10 in severity so patient was given additional dose of IV Dilaudid and IV Zofran.    Gallbladder ultrasound obtained which was personally reviewed and interpreted and showed distended gallbladder along with gallstones and sludge.     Given patient's significant right upper quadrant pain along with leukocytosis and findings on ultrasound high clinical concern for acute cholecystitis.   Patient empirically started on IV Zosyn for prophylactic coverage of possible cholecystitis.      Contacted general surgeon on-call who recommended admission to their service for further evaluation and likely cholecystectomy.  Patient agreeable with this plan.                               Orders placed during this visit:  Orders Placed This Encounter   Procedures    CT Abdomen Pelvis With Contrast    US Gallbladder    Roby Draw    Comprehensive Metabolic Panel    Lipase    Single High Sensitivity Troponin T    Urinalysis With Microscopic If Indicated (No Culture) - Urine, Clean Catch    CBC Auto Differential    Urinalysis, Microscopic Only - Urine, Clean Catch    NPO Diet NPO Type: Strict NPO    Undress & Gown    Continuous Pulse Oximetry    Vital Signs    Oxygen Therapy- Nasal Cannula; Titrate 1-6 LPM Per SpO2; 90 - 95%    ECG 12 Lead ED Triage Standing Order; Abdominal Pain (Upper)    Insert Peripheral IV    Initiate Observation Status    CBC & Differential    Green Top (Gel)    Lavender Top    Gold Top - SST    Light Blue Top         ED Course:    Consultants:                  Shared Decision Making:  After my consideration of clinical presentation and any laboratory/radiology studies obtained, I discussed the findings with the patient/patient representative who is in agreement with the  treatment plan and the final disposition.   Risks and benefits of discharge and/or observation/admission were discussed.      AS OF 06:33 EDT VITALS:    BP - 147/84  HR - 63  TEMP - 98.1 °F (36.7 °C) (Oral)  O2 SATS - 96%                  DIAGNOSIS  Final diagnoses:   Acute cholecystitis   Biliary colic   Epigastric pain   Leukocytosis, unspecified type   Nausea and vomiting, unspecified vomiting type         DISPOSITION  Admit      Please note that portions of this document were completed with voice recognition software.        Yariel Guerra MD  07/29/24 0633       Yariel Guerra MD  07/29/24 0639

## 2024-07-29 NOTE — PLAN OF CARE
Goal Outcome Evaluation:              Outcome Evaluation: VSS. clear liquid diet at this time. NPO at midnight in preparation for scheduled surgery tomorrow. Patient reports adequate pain control with IV PRN medication.

## 2024-07-29 NOTE — ED NOTES
Dr Guerra notified of no blood cultures ordered. No cultures need prior to abx therapy per MD recommendations.

## 2024-07-30 ENCOUNTER — ANESTHESIA (OUTPATIENT)
Dept: PERIOP | Facility: HOSPITAL | Age: 44
End: 2024-07-30
Payer: COMMERCIAL

## 2024-07-30 ENCOUNTER — ANESTHESIA EVENT (OUTPATIENT)
Dept: PERIOP | Facility: HOSPITAL | Age: 44
End: 2024-07-30
Payer: COMMERCIAL

## 2024-07-30 ENCOUNTER — APPOINTMENT (OUTPATIENT)
Dept: GENERAL RADIOLOGY | Facility: HOSPITAL | Age: 44
End: 2024-07-30
Payer: COMMERCIAL

## 2024-07-30 LAB
ALBUMIN SERPL-MCNC: 3.6 G/DL (ref 3.5–5.2)
ALBUMIN/GLOB SERPL: 1.3 G/DL
ALP SERPL-CCNC: 88 U/L (ref 39–117)
ALT SERPL W P-5'-P-CCNC: 97 U/L (ref 1–33)
ANION GAP SERPL CALCULATED.3IONS-SCNC: 11.2 MMOL/L (ref 5–15)
AST SERPL-CCNC: 85 U/L (ref 1–32)
B-HCG UR QL: NEGATIVE
BASOPHILS # BLD AUTO: 0.07 10*3/MM3 (ref 0–0.2)
BASOPHILS NFR BLD AUTO: 0.4 % (ref 0–1.5)
BILIRUB SERPL-MCNC: 1.7 MG/DL (ref 0–1.2)
BUN SERPL-MCNC: 11 MG/DL (ref 6–20)
BUN/CREAT SERPL: 15.3 (ref 7–25)
CALCIUM SPEC-SCNC: 8.4 MG/DL (ref 8.6–10.5)
CHLORIDE SERPL-SCNC: 104 MMOL/L (ref 98–107)
CO2 SERPL-SCNC: 24.8 MMOL/L (ref 22–29)
CREAT SERPL-MCNC: 0.72 MG/DL (ref 0.57–1)
DEPRECATED RDW RBC AUTO: 41 FL (ref 37–54)
EGFRCR SERPLBLD CKD-EPI 2021: 105.9 ML/MIN/1.73
EOSINOPHIL # BLD AUTO: 0.07 10*3/MM3 (ref 0–0.4)
EOSINOPHIL NFR BLD AUTO: 0.4 % (ref 0.3–6.2)
ERYTHROCYTE [DISTWIDTH] IN BLOOD BY AUTOMATED COUNT: 13.2 % (ref 12.3–15.4)
GLOBULIN UR ELPH-MCNC: 2.8 GM/DL
GLUCOSE SERPL-MCNC: 128 MG/DL (ref 65–99)
HCT VFR BLD AUTO: 34.7 % (ref 34–46.6)
HGB BLD-MCNC: 11.4 G/DL (ref 12–15.9)
IMM GRANULOCYTES # BLD AUTO: 0.08 10*3/MM3 (ref 0–0.05)
IMM GRANULOCYTES NFR BLD AUTO: 0.5 % (ref 0–0.5)
LYMPHOCYTES # BLD AUTO: 1.15 10*3/MM3 (ref 0.7–3.1)
LYMPHOCYTES NFR BLD AUTO: 7 % (ref 19.6–45.3)
MCH RBC QN AUTO: 28.3 PG (ref 26.6–33)
MCHC RBC AUTO-ENTMCNC: 32.9 G/DL (ref 31.5–35.7)
MCV RBC AUTO: 86.1 FL (ref 79–97)
MONOCYTES # BLD AUTO: 1.45 10*3/MM3 (ref 0.1–0.9)
MONOCYTES NFR BLD AUTO: 8.9 % (ref 5–12)
NEUTROPHILS NFR BLD AUTO: 13.56 10*3/MM3 (ref 1.7–7)
NEUTROPHILS NFR BLD AUTO: 82.8 % (ref 42.7–76)
NRBC BLD AUTO-RTO: 0 /100 WBC (ref 0–0.2)
PLATELET # BLD AUTO: 265 10*3/MM3 (ref 140–450)
PMV BLD AUTO: 9.2 FL (ref 6–12)
POTASSIUM SERPL-SCNC: 3.7 MMOL/L (ref 3.5–5.2)
PROT SERPL-MCNC: 6.4 G/DL (ref 6–8.5)
RBC # BLD AUTO: 4.03 10*6/MM3 (ref 3.77–5.28)
SODIUM SERPL-SCNC: 140 MMOL/L (ref 136–145)
WBC NRBC COR # BLD AUTO: 16.38 10*3/MM3 (ref 3.4–10.8)

## 2024-07-30 PROCEDURE — 25010000002 KETOROLAC TROMETHAMINE PER 15 MG: Performed by: NURSE ANESTHETIST, CERTIFIED REGISTERED

## 2024-07-30 PROCEDURE — 25010000002 ONDANSETRON PER 1 MG: Performed by: NURSE ANESTHETIST, CERTIFIED REGISTERED

## 2024-07-30 PROCEDURE — 25010000002 FENTANYL CITRATE (PF) 50 MCG/ML SOLUTION: Performed by: NURSE ANESTHETIST, CERTIFIED REGISTERED

## 2024-07-30 PROCEDURE — 25010000002 HEPARIN (PORCINE) PER 1000 UNITS: Performed by: SURGERY

## 2024-07-30 PROCEDURE — 96372 THER/PROPH/DIAG INJ SC/IM: CPT

## 2024-07-30 PROCEDURE — 25010000002 HYDROMORPHONE 1 MG/ML SOLUTION: Performed by: SURGERY

## 2024-07-30 PROCEDURE — 25010000002 LIDOCAINE 1 % SOLUTION: Performed by: SURGERY

## 2024-07-30 PROCEDURE — 80053 COMPREHEN METABOLIC PANEL: CPT | Performed by: SURGERY

## 2024-07-30 PROCEDURE — 25510000001 IOPAMIDOL 61 % SOLUTION: Performed by: SURGERY

## 2024-07-30 PROCEDURE — 25010000002 PROPOFOL 10 MG/ML EMULSION: Performed by: NURSE ANESTHETIST, CERTIFIED REGISTERED

## 2024-07-30 PROCEDURE — 25010000002 SUGAMMADEX 200 MG/2ML SOLUTION: Performed by: NURSE ANESTHETIST, CERTIFIED REGISTERED

## 2024-07-30 PROCEDURE — 25810000003 LACTATED RINGERS PER 1000 ML: Performed by: SURGERY

## 2024-07-30 PROCEDURE — G0378 HOSPITAL OBSERVATION PER HR: HCPCS

## 2024-07-30 PROCEDURE — 25010000002 BUPIVACAINE (PF) 0.25 % SOLUTION: Performed by: SURGERY

## 2024-07-30 PROCEDURE — 25010000002 ONDANSETRON PER 1 MG: Performed by: SURGERY

## 2024-07-30 PROCEDURE — 25010000002 PIPERACILLIN SOD-TAZOBACTAM PER 1 G: Performed by: SURGERY

## 2024-07-30 PROCEDURE — 74300 X-RAY BILE DUCTS/PANCREAS: CPT

## 2024-07-30 PROCEDURE — 85025 COMPLETE CBC W/AUTO DIFF WBC: CPT | Performed by: SURGERY

## 2024-07-30 PROCEDURE — 25010000002 DEXAMETHASONE PER 1 MG: Performed by: NURSE ANESTHETIST, CERTIFIED REGISTERED

## 2024-07-30 PROCEDURE — 25010000002 HYDROMORPHONE 1 MG/ML SOLUTION: Performed by: NURSE ANESTHETIST, CERTIFIED REGISTERED

## 2024-07-30 PROCEDURE — 96376 TX/PRO/DX INJ SAME DRUG ADON: CPT

## 2024-07-30 DEVICE — LIGAMAX 5 MM ENDOSCOPIC MULTIPLE CLIP APPLIER
Type: IMPLANTABLE DEVICE | Site: ABDOMEN | Status: FUNCTIONAL
Brand: LIGAMAX

## 2024-07-30 DEVICE — SEAL HEMO SURG ARISTA/AH ABS/PWDR 5GM: Type: IMPLANTABLE DEVICE | Site: ABDOMEN | Status: FUNCTIONAL

## 2024-07-30 RX ORDER — SODIUM CHLORIDE 0.9 % (FLUSH) 0.9 %
10 SYRINGE (ML) INJECTION EVERY 12 HOURS SCHEDULED
Status: DISCONTINUED | OUTPATIENT
Start: 2024-07-30 | End: 2024-07-30 | Stop reason: HOSPADM

## 2024-07-30 RX ORDER — KETOROLAC TROMETHAMINE 30 MG/ML
15 INJECTION, SOLUTION INTRAMUSCULAR; INTRAVENOUS EVERY 6 HOURS PRN
Status: DISCONTINUED | OUTPATIENT
Start: 2024-07-30 | End: 2024-08-01 | Stop reason: HOSPADM

## 2024-07-30 RX ORDER — SODIUM CHLORIDE, SODIUM LACTATE, POTASSIUM CHLORIDE, CALCIUM CHLORIDE 600; 310; 30; 20 MG/100ML; MG/100ML; MG/100ML; MG/100ML
125 INJECTION, SOLUTION INTRAVENOUS CONTINUOUS
Status: DISCONTINUED | OUTPATIENT
Start: 2024-07-30 | End: 2024-08-01 | Stop reason: HOSPADM

## 2024-07-30 RX ORDER — FENTANYL CITRATE 50 UG/ML
INJECTION, SOLUTION INTRAMUSCULAR; INTRAVENOUS AS NEEDED
Status: DISCONTINUED | OUTPATIENT
Start: 2024-07-30 | End: 2024-07-30 | Stop reason: SURG

## 2024-07-30 RX ORDER — SUCCINYLCHOLINE/SOD CL,ISO/PF 200MG/10ML
SYRINGE (ML) INTRAVENOUS AS NEEDED
Status: DISCONTINUED | OUTPATIENT
Start: 2024-07-30 | End: 2024-07-30 | Stop reason: SURG

## 2024-07-30 RX ORDER — ACETAMINOPHEN 500 MG
1000 TABLET ORAL EVERY 6 HOURS SCHEDULED
Status: DISCONTINUED | OUTPATIENT
Start: 2024-07-30 | End: 2024-08-01 | Stop reason: HOSPADM

## 2024-07-30 RX ORDER — ROCURONIUM BROMIDE 50 MG/5 ML
SYRINGE (ML) INTRAVENOUS AS NEEDED
Status: DISCONTINUED | OUTPATIENT
Start: 2024-07-30 | End: 2024-07-30 | Stop reason: SURG

## 2024-07-30 RX ORDER — BUPIVACAINE HYDROCHLORIDE 2.5 MG/ML
INJECTION, SOLUTION EPIDURAL; INFILTRATION; INTRACAUDAL AS NEEDED
Status: DISCONTINUED | OUTPATIENT
Start: 2024-07-30 | End: 2024-07-30 | Stop reason: HOSPADM

## 2024-07-30 RX ORDER — DEXAMETHASONE SODIUM PHOSPHATE 4 MG/ML
INJECTION, SOLUTION INTRA-ARTICULAR; INTRALESIONAL; INTRAMUSCULAR; INTRAVENOUS; SOFT TISSUE AS NEEDED
Status: DISCONTINUED | OUTPATIENT
Start: 2024-07-30 | End: 2024-07-30 | Stop reason: SURG

## 2024-07-30 RX ORDER — HALOPERIDOL 5 MG/ML
0.5 INJECTION INTRAMUSCULAR ONCE
Status: DISCONTINUED | OUTPATIENT
Start: 2024-07-30 | End: 2024-07-30 | Stop reason: HOSPADM

## 2024-07-30 RX ORDER — SODIUM CHLORIDE 0.9 % (FLUSH) 0.9 %
10 SYRINGE (ML) INJECTION AS NEEDED
Status: DISCONTINUED | OUTPATIENT
Start: 2024-07-30 | End: 2024-07-30 | Stop reason: HOSPADM

## 2024-07-30 RX ORDER — SODIUM CHLORIDE 9 MG/ML
40 INJECTION, SOLUTION INTRAVENOUS AS NEEDED
Status: DISCONTINUED | OUTPATIENT
Start: 2024-07-30 | End: 2024-07-30 | Stop reason: HOSPADM

## 2024-07-30 RX ORDER — IPRATROPIUM BROMIDE AND ALBUTEROL SULFATE 2.5; .5 MG/3ML; MG/3ML
3 SOLUTION RESPIRATORY (INHALATION) ONCE
Status: DISCONTINUED | OUTPATIENT
Start: 2024-07-30 | End: 2024-07-30 | Stop reason: HOSPADM

## 2024-07-30 RX ORDER — LORAZEPAM 2 MG/ML
1 INJECTION INTRAMUSCULAR ONCE
Status: DISCONTINUED | OUTPATIENT
Start: 2024-07-30 | End: 2024-07-30 | Stop reason: HOSPADM

## 2024-07-30 RX ORDER — KETOROLAC TROMETHAMINE 30 MG/ML
INJECTION, SOLUTION INTRAMUSCULAR; INTRAVENOUS AS NEEDED
Status: DISCONTINUED | OUTPATIENT
Start: 2024-07-30 | End: 2024-07-30 | Stop reason: SURG

## 2024-07-30 RX ORDER — ONDANSETRON 2 MG/ML
INJECTION INTRAMUSCULAR; INTRAVENOUS AS NEEDED
Status: DISCONTINUED | OUTPATIENT
Start: 2024-07-30 | End: 2024-07-30 | Stop reason: SURG

## 2024-07-30 RX ORDER — MAGNESIUM HYDROXIDE 1200 MG/15ML
LIQUID ORAL AS NEEDED
Status: DISCONTINUED | OUTPATIENT
Start: 2024-07-30 | End: 2024-07-30 | Stop reason: HOSPADM

## 2024-07-30 RX ORDER — LIDOCAINE HYDROCHLORIDE 10 MG/ML
INJECTION, SOLUTION INFILTRATION; PERINEURAL AS NEEDED
Status: DISCONTINUED | OUTPATIENT
Start: 2024-07-30 | End: 2024-07-30 | Stop reason: HOSPADM

## 2024-07-30 RX ORDER — PROPOFOL 10 MG/ML
VIAL (ML) INTRAVENOUS AS NEEDED
Status: DISCONTINUED | OUTPATIENT
Start: 2024-07-30 | End: 2024-07-30 | Stop reason: SURG

## 2024-07-30 RX ADMIN — HYDROMORPHONE HYDROCHLORIDE 0.5 MG: 1 INJECTION, SOLUTION INTRAMUSCULAR; INTRAVENOUS; SUBCUTANEOUS at 14:02

## 2024-07-30 RX ADMIN — PIPERACILLIN SODIUM AND TAZOBACTAM SODIUM 3.38 G: 3; .375 INJECTION, POWDER, LYOPHILIZED, FOR SOLUTION INTRAVENOUS at 17:06

## 2024-07-30 RX ADMIN — SODIUM CHLORIDE, POTASSIUM CHLORIDE, SODIUM LACTATE AND CALCIUM CHLORIDE 50 ML/HR: 600; 310; 30; 20 INJECTION, SOLUTION INTRAVENOUS at 17:07

## 2024-07-30 RX ADMIN — SUGAMMADEX 200 MG: 100 INJECTION, SOLUTION INTRAVENOUS at 13:30

## 2024-07-30 RX ADMIN — Medication 50 MG: at 11:02

## 2024-07-30 RX ADMIN — Medication 20 MG: at 12:00

## 2024-07-30 RX ADMIN — PIPERACILLIN SODIUM AND TAZOBACTAM SODIUM 3.38 G: 3; .375 INJECTION, POWDER, LYOPHILIZED, FOR SOLUTION INTRAVENOUS at 23:37

## 2024-07-30 RX ADMIN — HEPARIN SODIUM 5000 UNITS: 5000 INJECTION INTRAVENOUS; SUBCUTANEOUS at 05:39

## 2024-07-30 RX ADMIN — SUGAMMADEX 200 MG: 100 INJECTION, SOLUTION INTRAVENOUS at 13:27

## 2024-07-30 RX ADMIN — HYDROMORPHONE HYDROCHLORIDE 0.5 MG: 1 INJECTION, SOLUTION INTRAMUSCULAR; INTRAVENOUS; SUBCUTANEOUS at 23:38

## 2024-07-30 RX ADMIN — FENTANYL CITRATE 50 MCG: 50 INJECTION, SOLUTION INTRAMUSCULAR; INTRAVENOUS at 11:25

## 2024-07-30 RX ADMIN — ONDANSETRON 4 MG: 2 INJECTION INTRAMUSCULAR; INTRAVENOUS at 13:27

## 2024-07-30 RX ADMIN — DEXAMETHASONE SODIUM PHOSPHATE 4 MG: 4 INJECTION, SOLUTION INTRA-ARTICULAR; INTRALESIONAL; INTRAMUSCULAR; INTRAVENOUS; SOFT TISSUE at 11:02

## 2024-07-30 RX ADMIN — ONDANSETRON 4 MG: 2 INJECTION INTRAMUSCULAR; INTRAVENOUS at 11:02

## 2024-07-30 RX ADMIN — DEXAMETHASONE SODIUM PHOSPHATE 4 MG: 4 INJECTION, SOLUTION INTRA-ARTICULAR; INTRALESIONAL; INTRAMUSCULAR; INTRAVENOUS; SOFT TISSUE at 13:27

## 2024-07-30 RX ADMIN — HYDROMORPHONE HYDROCHLORIDE 0.5 MG: 1 INJECTION, SOLUTION INTRAMUSCULAR; INTRAVENOUS; SUBCUTANEOUS at 13:48

## 2024-07-30 RX ADMIN — Medication 100 MG: at 11:02

## 2024-07-30 RX ADMIN — PIPERACILLIN SODIUM AND TAZOBACTAM SODIUM 3.38 G: 3; .375 INJECTION, POWDER, LYOPHILIZED, FOR SOLUTION INTRAVENOUS at 01:30

## 2024-07-30 RX ADMIN — SODIUM CHLORIDE, POTASSIUM CHLORIDE, SODIUM LACTATE AND CALCIUM CHLORIDE 50 ML/HR: 600; 310; 30; 20 INJECTION, SOLUTION INTRAVENOUS at 10:26

## 2024-07-30 RX ADMIN — PROPOFOL 200 MG: 10 INJECTION, EMULSION INTRAVENOUS at 11:02

## 2024-07-30 RX ADMIN — HYDROMORPHONE HYDROCHLORIDE 0.5 MG: 1 INJECTION, SOLUTION INTRAMUSCULAR; INTRAVENOUS; SUBCUTANEOUS at 09:49

## 2024-07-30 RX ADMIN — ONDANSETRON 4 MG: 2 INJECTION INTRAMUSCULAR; INTRAVENOUS at 04:53

## 2024-07-30 RX ADMIN — KETOROLAC TROMETHAMINE 30 MG: 30 INJECTION, SOLUTION INTRAMUSCULAR; INTRAVENOUS at 13:28

## 2024-07-30 RX ADMIN — HEPARIN SODIUM 5000 UNITS: 5000 INJECTION INTRAVENOUS; SUBCUTANEOUS at 23:38

## 2024-07-30 RX ADMIN — ACETAMINOPHEN 1000 MG: 500 TABLET, FILM COATED ORAL at 17:06

## 2024-07-30 RX ADMIN — Medication 10 ML: at 21:36

## 2024-07-30 RX ADMIN — LIDOCAINE HYDROCHLORIDE 60 MG: 20 INJECTION, SOLUTION INTRAVENOUS at 11:02

## 2024-07-30 RX ADMIN — PIPERACILLIN SODIUM AND TAZOBACTAM SODIUM 3.38 G: 3; .375 INJECTION, POWDER, LYOPHILIZED, FOR SOLUTION INTRAVENOUS at 08:21

## 2024-07-30 NOTE — ANESTHESIA POSTPROCEDURE EVALUATION
Patient: Linda Snell    Procedure Summary       Date: 07/30/24 Room / Location: [facility] OR 3 / [facility] OR    Anesthesia Start: 1100 Anesthesia Stop:     Procedure: CHOLECYSTECTOMY LAPAROSCOPIC WITH INTRAOPERATIVE CHOLANGIOGRAPHY (Abdomen) Diagnosis:       Acute calculous cholecystitis      (Acute calculous cholecystitis [K80.00])    Surgeons: Reena Cao MD Provider: Hector Esquivel CRNA    Anesthesia Type: general ASA Status: 3            Anesthesia Type: general    Vitals  Vitals Value Taken Time   /65 07/30/24 1345   Temp     Pulse 101 07/30/24 1346   Resp     SpO2 98 % 07/30/24 1346   Vitals shown include unfiled device data.        Post Anesthesia Care and Evaluation    Patient location during evaluation: PACU  Patient participation: complete - patient participated  Level of consciousness: awake  Pain score: 0  Pain management: adequate    Airway patency: patent  Anesthetic complications: No anesthetic complications  PONV Status: none  Cardiovascular status: acceptable  Respiratory status: acceptable, face mask and spontaneous ventilation  Hydration status: acceptable    Comments: See R.N. note for postop vital signs.vsss resp spont, reflexes intact, responsive, report given to pacu nurse

## 2024-07-30 NOTE — OP NOTE
PROCEDURE DATE: 7/30/2024    SURGEON: Reena Cao MD, FACS    PREOPERATIVE DIAGNOSIS: Acute calculous cholecystitis [K80.00]    POSTOPERATIVE DIAGNOSIS: same    PROCEDURE: Laparoscopic cholecystectomy with intraoperative cholangiogram    ANESTHESIA: general    EBL: 100mL    IMPLANTS:     Implant Name Type Inv. Item Serial No.  Lot No. LRB No. Used Action   CLIPAPPLR M/ ENDO LIGAMAX5 5MM 33CM MD/LG - [id number] Implant CLIPAPPLR M/ ENDO LIGAMAX5 5MM 33CM MD/LG  [employer]  DIV OF J AND J [id number] N/A 1 Implanted   SEAL HEMO SURG ROMERO/AH ABS/PWDR 5GM - [id number] Implant SEAL HEMO SURG ROMERO/AH ABS/PWDR 5GM  MEDAFOR HEMOSTATIS POLYMER [employer] [id number] N/A 1 Implanted     SPECIMENS:           Specimens       ID Source Type Tests Collected By Collected At Frozen?    A Gallbladder Tissue TISSUE EXAM, [facility] ([facility], [facility])   Monserrat Chao RN 7/30/24 0944     Description: gallbladder with contents       INDICATIONS FOR THE PROCEDURE:  Ms. Snell is a 44-year-old female patient admitted via the emergency department yesterday with signs and symptoms suggestive of acute calculus cholecystitis.  She was admitted to the surgical service and started on broad-spectrum IV antibiotic therapy with a plan for cholecystectomy within 24 hours for definitive management.  She was counseled accordingly, and is now being brought to the operating room for the scheduled surgical procedure, having provided informed consent.    DESCRIPTION OF THE PROCEDURE:  The patient was seen and examined in the preoperative holding area on the day of surgery and there are no changes to the medical history.  The patient was then taken to the operating room and placed supine on the operating table where a timeout is performed using the WHO checklist.  The patient received appropriate preoperative antibiotics as well as subcutaneous heparin for DVT prophylaxis.    Following the satisfactory induction of general anesthesia  the patient's abdomen was prepped and draped in the usual sterile fashion.  A curvilinear incision was made just above the umbilicus and was carried through the soft tissue to the level of the fascia.  The fascia was grasped and elevated between Kocher clamps and incised sharply with a knife.  Entry was confirmed into the peritoneum visually and there was no bowel visible in the vicinity of this incision.  Two stay sutures of 0 Vicryl were placed in either side of the fascia and a Alford cannula was inserted under direct visualization.  The sutures were used to secure the cannula.    The abdomen was insufflated with carbon dioxide to a pressure of 15 mmHg and the laparoscope was introduced.  The abdomen was inspected and no injuries were noted from initial trocar placement.  Three additional 5 mm ports were then placed in the subxiphoid, right subcostal, and right upper quadrant positions under direct visualization.  The patient was then placed into the reverse Trendelenburg position with the left side down.    Immediately upon visualizing the right upper quadrant, the gallbladder was noted to be completely encased in thickened omentum which had become adherent not only to the gallbladder, but also the liver.  The stomach was also pulled up and had become adherent to the right lobe of the liver, and the undersurface of the gallbladder.  A combination of blunt dissection and electrocautery was used to separate the attachments between the omentum and gallbladder, and the stomach, and the gallbladder.  Electrocautery was also used along the edge of the right lobe of the liver to separate the edge of the liver where it had become fused with the edge of the omentum.    The gallbladder was noted to be extremely tense, and distended, and I elected to decompress this with a needle aspirator to facilitate manipulation.  Despite this, the gallbladder was extremely thickened and scarred in as a result of severe acute on  chronic cholecystitis, and was difficult to manipulate.  already initiated The dome of the gallbladder was then grasped and retracted cephalad up over the dome of the liver.  Additional blunt dissection was required to sweep away the inflamed omentum which had become adherent to the infundibulum of the gallbladder.  The infundibulum of the gallbladder was grasped and retracted laterally in order to splay out the triangle of Calot.  This proved to be extremely difficult secondary to the level of inflammation within this region.  The gallbladder and cystic duct had basically become folded upon themselves,  and the dissection in this region was rather tedious.  The peritoneum overlying the gallbladder infundibulum was incised with Bovie electrocautery and the cystic duct and cystic artery were eventually identified and circumferentially skeletonized.  The cystic duct appeared to be rather shortened due to the inflammation present, and I elected to proceed with a cholangiogram intraoperatively to confirm the relevant biliary anatomy before proceeding.  A small cut was made with the laparoscopic scissors in the gallbladder infundibulum, just at the junction of the cystic duct.  A cholangiogram catheter was inserted through the subxiphoid port site and advanced into the cystic duct.  It was secured with a cholangiogram catheter clamped.  The C arm was brought into the field for intraoperative fluoroscopy, and an intraoperative cholangiogram was successfully completed.  The pertinent anatomy was confirmed, and there was no evidence of abnormality of the biliary tree, and no evidence of biliary obstruction.  The cholangiogram catheter was then removed, and the cystic duct was doubly clipped and divided as close as possible to the gallbladder.  The cystic artery was doubly clipped and divided close to the gallbladder.    The gallbladder was then dissected free from its peritoneal attachments to the liver using Bovie  electrocautery.  Hemostasis was ensured using electrocautery.  Once the gallbladder was completed freed from the undersurface of liver was placed into an Endo Catch bag.  The gallbladder fossa was then reinspected and copiously irrigated with saline and hemostasis was ensured. Arist powder wea applied to the gallbladder fossa for additional hemostasis.  The cystic duct stump and cystic artery stump were reinspected and noted to be secure.  Following this the upper abdominal trocars were removed under direct visualization and no bleeding was noted.  The laparoscope was withdrawn and the abdomen was desufflated.  The Alford cannula was removed out of the umbilical port site amd was then enlarged significantly to facilitate removal of the gallbladder which was completely contained within the Endo Catch bag.  This was passed off the field as specimen.  The fascia of the umbilical port site was then closed using a 0 PDS suture placed in a figure-of-eight fashion ×2. The subcutaneous tissue was closed with a 0 vicryl suture.  The skin of all incisions was closed using a 5-0 PDS suture placed in a subcuticular fashion.  0.25% Marcaine was injected into the wounds for postoperative analgesia.  Mastisol and steri strips were applied to the wounds and covered with dry sterile dressings.    There were no immediate complications noted and the procedure was well tolerated by the patient.  All sponge, needle,  and instrument  counts were correct at the conclusion of procedure.  Dr. Cao was present scrubbed for the procedure and its entirety.  The patient was awakened from anesthesia and taken to the recovery room in good condition.

## 2024-07-30 NOTE — CASE MANAGEMENT/SOCIAL WORK
Case Management/Social Work    Patient Name:  Linda Snell  YOB: 1980  MRN: [id number]  Admit Date:  7/29/2024        11:13 EDT  Met with patient at bedside. She plans to discharge home with her spouse once medically ready. Patient states no CM needs at this time. CM will continue to follow.      Electronically signed by:  Mike Armstrong RN  07/30/24 11:13 EDT

## 2024-07-30 NOTE — ANESTHESIA PREPROCEDURE EVALUATION
Anesthesia Evaluation     Patient summary reviewed and Nursing notes reviewed   no history of anesthetic complications:   NPO Solid Status: > 8 hours  NPO Liquid Status: > 8 hours           Airway   Mallampati: I  TM distance: >3 FB  Neck ROM: full  no difficulty expected  Dental - normal exam     Pulmonary - negative pulmonary ROS and normal exam   Cardiovascular - normal exam    (+) hypertension      Neuro/Psych- negative ROS  GI/Hepatic/Renal/Endo    (+) obesity, morbid obesity, GERD, thyroid problem hypothyroidism    Musculoskeletal (-) negative ROS    Abdominal    Substance History - negative use     OB/GYN negative ob/gyn ROS         Other - negative ROS                   Anesthesia Plan    ASA 3     general     intravenous induction     Anesthetic plan, risks, benefits, and alternatives have been provided, discussed and informed consent has been obtained with: patient.    CODE STATUS:    Level Of Support Discussed With: Patient  Code Status (Patient has no pulse and is not breathing): CPR (Attempt to Resuscitate)  Medical Interventions (Patient has pulse or is breathing): Full Support

## 2024-07-30 NOTE — ANESTHESIA PROCEDURE NOTES
Airway  Urgency: elective    Date/Time: 7/30/2024 11:00 AM  Airway not difficult    General Information and Staff    Patient location during procedure: OR  CRNA/CAA: Hector Esquivel CRNA    Indications and Patient Condition  Indications for airway management: airway protection    Preoxygenated: yes  Mask difficulty assessment: 1 - vent by mask    Final Airway Details  Final airway type: endotracheal airway      Successful airway: ETT  Cuffed: yes   Successful intubation technique: direct laryngoscopy  Endotracheal tube insertion site: oral  Blade: Juan  Blade size: 3  ETT size (mm): 7.0  Cormack-Lehane Classification: grade I - full view of glottis  Placement verified by: chest auscultation and capnometry   Measured from: lips  ETT/EBT  to lips (cm): 21  Number of attempts at approach: 1  Assessment: lips, teeth, and gum same as pre-op and atraumatic intubation

## 2024-07-31 LAB
ALBUMIN SERPL-MCNC: 3.3 G/DL (ref 3.5–5.2)
ALBUMIN/GLOB SERPL: 1.1 G/DL
ALP SERPL-CCNC: 96 U/L (ref 39–117)
ALT SERPL W P-5'-P-CCNC: 121 U/L (ref 1–33)
ANION GAP SERPL CALCULATED.3IONS-SCNC: 10 MMOL/L (ref 5–15)
AST SERPL-CCNC: 70 U/L (ref 1–32)
BASOPHILS # BLD AUTO: 0.02 10*3/MM3 (ref 0–0.2)
BASOPHILS NFR BLD AUTO: 0.1 % (ref 0–1.5)
BILIRUB SERPL-MCNC: 1.4 MG/DL (ref 0–1.2)
BUN SERPL-MCNC: 10 MG/DL (ref 6–20)
BUN/CREAT SERPL: 14.9 (ref 7–25)
CALCIUM SPEC-SCNC: 8.3 MG/DL (ref 8.6–10.5)
CHLORIDE SERPL-SCNC: 103 MMOL/L (ref 98–107)
CO2 SERPL-SCNC: 25 MMOL/L (ref 22–29)
CREAT SERPL-MCNC: 0.67 MG/DL (ref 0.57–1)
DEPRECATED RDW RBC AUTO: 42.2 FL (ref 37–54)
EGFRCR SERPLBLD CKD-EPI 2021: 110.7 ML/MIN/1.73
EOSINOPHIL # BLD AUTO: 0.01 10*3/MM3 (ref 0–0.4)
EOSINOPHIL NFR BLD AUTO: 0.1 % (ref 0.3–6.2)
ERYTHROCYTE [DISTWIDTH] IN BLOOD BY AUTOMATED COUNT: 13.1 % (ref 12.3–15.4)
GLOBULIN UR ELPH-MCNC: 2.9 GM/DL
GLUCOSE SERPL-MCNC: 109 MG/DL (ref 65–99)
HCT VFR BLD AUTO: 33.2 % (ref 34–46.6)
HGB BLD-MCNC: 10.6 G/DL (ref 12–15.9)
IMM GRANULOCYTES # BLD AUTO: 0.13 10*3/MM3 (ref 0–0.05)
IMM GRANULOCYTES NFR BLD AUTO: 0.7 % (ref 0–0.5)
LYMPHOCYTES # BLD AUTO: 1.19 10*3/MM3 (ref 0.7–3.1)
LYMPHOCYTES NFR BLD AUTO: 6.4 % (ref 19.6–45.3)
MCH RBC QN AUTO: 28 PG (ref 26.6–33)
MCHC RBC AUTO-ENTMCNC: 31.9 G/DL (ref 31.5–35.7)
MCV RBC AUTO: 87.8 FL (ref 79–97)
MONOCYTES # BLD AUTO: 1.87 10*3/MM3 (ref 0.1–0.9)
MONOCYTES NFR BLD AUTO: 10 % (ref 5–12)
NEUTROPHILS NFR BLD AUTO: 15.46 10*3/MM3 (ref 1.7–7)
NEUTROPHILS NFR BLD AUTO: 82.7 % (ref 42.7–76)
NRBC BLD AUTO-RTO: 0 /100 WBC (ref 0–0.2)
PLATELET # BLD AUTO: 244 10*3/MM3 (ref 140–450)
PMV BLD AUTO: 9.4 FL (ref 6–12)
POTASSIUM SERPL-SCNC: 3.6 MMOL/L (ref 3.5–5.2)
PROT SERPL-MCNC: 6.2 G/DL (ref 6–8.5)
RBC # BLD AUTO: 3.78 10*6/MM3 (ref 3.77–5.28)
SODIUM SERPL-SCNC: 138 MMOL/L (ref 136–145)
WBC NRBC COR # BLD AUTO: 18.68 10*3/MM3 (ref 3.4–10.8)

## 2024-07-31 PROCEDURE — 80053 COMPREHEN METABOLIC PANEL: CPT | Performed by: SURGERY

## 2024-07-31 PROCEDURE — 25010000002 HEPARIN (PORCINE) PER 1000 UNITS: Performed by: SURGERY

## 2024-07-31 PROCEDURE — 25010000002 HYDROMORPHONE 1 MG/ML SOLUTION: Performed by: SURGERY

## 2024-07-31 PROCEDURE — G0378 HOSPITAL OBSERVATION PER HR: HCPCS

## 2024-07-31 PROCEDURE — 25010000002 KETOROLAC TROMETHAMINE PER 15 MG: Performed by: SURGERY

## 2024-07-31 PROCEDURE — 25010000002 PIPERACILLIN SOD-TAZOBACTAM PER 1 G: Performed by: SURGERY

## 2024-07-31 PROCEDURE — 85025 COMPLETE CBC W/AUTO DIFF WBC: CPT | Performed by: SURGERY

## 2024-07-31 RX ORDER — OXYCODONE HYDROCHLORIDE 5 MG/1
10 TABLET ORAL EVERY 4 HOURS PRN
Status: DISCONTINUED | OUTPATIENT
Start: 2024-07-31 | End: 2024-08-01 | Stop reason: HOSPADM

## 2024-07-31 RX ORDER — SIMETHICONE 80 MG
80 TABLET,CHEWABLE ORAL 4 TIMES DAILY PRN
Status: DISCONTINUED | OUTPATIENT
Start: 2024-07-31 | End: 2024-08-01 | Stop reason: HOSPADM

## 2024-07-31 RX ADMIN — KETOROLAC TROMETHAMINE 15 MG: 30 INJECTION, SOLUTION INTRAMUSCULAR; INTRAVENOUS at 03:00

## 2024-07-31 RX ADMIN — HEPARIN SODIUM 5000 UNITS: 5000 INJECTION INTRAVENOUS; SUBCUTANEOUS at 05:40

## 2024-07-31 RX ADMIN — PIPERACILLIN SODIUM AND TAZOBACTAM SODIUM 3.38 G: 3; .375 INJECTION, POWDER, LYOPHILIZED, FOR SOLUTION INTRAVENOUS at 08:35

## 2024-07-31 RX ADMIN — ACETAMINOPHEN 1000 MG: 500 TABLET, FILM COATED ORAL at 17:19

## 2024-07-31 RX ADMIN — HEPARIN SODIUM 5000 UNITS: 5000 INJECTION INTRAVENOUS; SUBCUTANEOUS at 23:14

## 2024-07-31 RX ADMIN — HYDROMORPHONE HYDROCHLORIDE 0.5 MG: 1 INJECTION, SOLUTION INTRAMUSCULAR; INTRAVENOUS; SUBCUTANEOUS at 03:41

## 2024-07-31 RX ADMIN — PIPERACILLIN SODIUM AND TAZOBACTAM SODIUM 3.38 G: 3; .375 INJECTION, POWDER, LYOPHILIZED, FOR SOLUTION INTRAVENOUS at 15:53

## 2024-07-31 RX ADMIN — ACETAMINOPHEN 1000 MG: 500 TABLET, FILM COATED ORAL at 11:19

## 2024-07-31 RX ADMIN — KETOROLAC TROMETHAMINE 15 MG: 30 INJECTION, SOLUTION INTRAMUSCULAR; INTRAVENOUS at 08:35

## 2024-07-31 RX ADMIN — ACETAMINOPHEN 1000 MG: 500 TABLET, FILM COATED ORAL at 05:40

## 2024-07-31 RX ADMIN — HYDROMORPHONE HYDROCHLORIDE 0.5 MG: 1 INJECTION, SOLUTION INTRAMUSCULAR; INTRAVENOUS; SUBCUTANEOUS at 01:44

## 2024-07-31 RX ADMIN — Medication 10 ML: at 21:36

## 2024-07-31 RX ADMIN — HEPARIN SODIUM 5000 UNITS: 5000 INJECTION INTRAVENOUS; SUBCUTANEOUS at 13:44

## 2024-07-31 RX ADMIN — KETOROLAC TROMETHAMINE 15 MG: 30 INJECTION, SOLUTION INTRAMUSCULAR; INTRAVENOUS at 14:02

## 2024-07-31 NOTE — PROGRESS NOTES
"     LOS: 0 days   Patient Care Team:  Huma Steele APRN as PCP - General (Nurse Practitioner)    Chief Complaint:  ***    Subjective     Interval History:     Patient Complaints: {none/text:37774}  Patient Denies:  ***  History taken from: {source:88744::\"patient\"}    Review of Systems:    {ROS Negative/Negative except for:181744748}    Objective     Vital Signs  Temp:  [97.9 °F (36.6 °C)-98.9 °F (37.2 °C)] 98.6 °F (37 °C)  Heart Rate:  [75-98] 98  Resp:  [16-18] 18  BP: ()/(54-74) 99/61    Physical Exam:   {General Physical Exam:00016}     Results Review:     {Results Review:68758::\"I reviewed the patient's new clinical results.\"}    Medication Review: ***    Assessment & Plan       Acute cholecystitis    Acute calculous cholecystitis      ***    Plan for disposition:{Disposition:910459988}    Reena Cao MD  07/31/24  17:34 EDT      Time: {Time spent:311019533}    "

## 2024-07-31 NOTE — CASE MANAGEMENT/SOCIAL WORK
Case Management/Social Work    Patient Name:  Linda Snell  YOB: 1980  MRN: [id number]  Admit Date:  7/29/2024        10:03 EDT  Met with patient at bedside. Patient plans to discharge home once medically ready. States no needs at this time. CM will continue to follow.      Electronically signed by:  Mike Armstrong RN  07/31/24 10:03 EDT

## 2024-08-01 VITALS
TEMPERATURE: 98.4 F | HEIGHT: 60 IN | WEIGHT: 198 LBS | BODY MASS INDEX: 38.87 KG/M2 | SYSTOLIC BLOOD PRESSURE: 93 MMHG | HEART RATE: 92 BPM | RESPIRATION RATE: 16 BRPM | DIASTOLIC BLOOD PRESSURE: 62 MMHG | OXYGEN SATURATION: 94 %

## 2024-08-01 PROBLEM — K81.0 ACUTE CHOLECYSTITIS: Status: RESOLVED | Noted: 2024-07-29 | Resolved: 2024-08-01

## 2024-08-01 LAB
ALBUMIN SERPL-MCNC: 3.3 G/DL (ref 3.5–5.2)
ALBUMIN/GLOB SERPL: 1.1 G/DL
ALP SERPL-CCNC: 114 U/L (ref 39–117)
ALT SERPL W P-5'-P-CCNC: 99 U/L (ref 1–33)
ANION GAP SERPL CALCULATED.3IONS-SCNC: 9.9 MMOL/L (ref 5–15)
AST SERPL-CCNC: 43 U/L (ref 1–32)
BASOPHILS # BLD AUTO: 0.05 10*3/MM3 (ref 0–0.2)
BASOPHILS NFR BLD AUTO: 0.4 % (ref 0–1.5)
BILIRUB SERPL-MCNC: 1.5 MG/DL (ref 0–1.2)
BUN SERPL-MCNC: 8 MG/DL (ref 6–20)
BUN/CREAT SERPL: 10.8 (ref 7–25)
CALCIUM SPEC-SCNC: 8.3 MG/DL (ref 8.6–10.5)
CHLORIDE SERPL-SCNC: 104 MMOL/L (ref 98–107)
CO2 SERPL-SCNC: 26.1 MMOL/L (ref 22–29)
CREAT SERPL-MCNC: 0.74 MG/DL (ref 0.57–1)
DEPRECATED RDW RBC AUTO: 42.7 FL (ref 37–54)
EGFRCR SERPLBLD CKD-EPI 2021: 102.5 ML/MIN/1.73
EOSINOPHIL # BLD AUTO: 0.2 10*3/MM3 (ref 0–0.4)
EOSINOPHIL NFR BLD AUTO: 1.6 % (ref 0.3–6.2)
ERYTHROCYTE [DISTWIDTH] IN BLOOD BY AUTOMATED COUNT: 13.2 % (ref 12.3–15.4)
GLOBULIN UR ELPH-MCNC: 2.9 GM/DL
GLUCOSE SERPL-MCNC: 88 MG/DL (ref 65–99)
HCT VFR BLD AUTO: 31.9 % (ref 34–46.6)
HGB BLD-MCNC: 10.1 G/DL (ref 12–15.9)
IMM GRANULOCYTES # BLD AUTO: 0.08 10*3/MM3 (ref 0–0.05)
IMM GRANULOCYTES NFR BLD AUTO: 0.6 % (ref 0–0.5)
LYMPHOCYTES # BLD AUTO: 1.46 10*3/MM3 (ref 0.7–3.1)
LYMPHOCYTES NFR BLD AUTO: 11.8 % (ref 19.6–45.3)
MCH RBC QN AUTO: 27.9 PG (ref 26.6–33)
MCHC RBC AUTO-ENTMCNC: 31.7 G/DL (ref 31.5–35.7)
MCV RBC AUTO: 88.1 FL (ref 79–97)
MONOCYTES # BLD AUTO: 1.05 10*3/MM3 (ref 0.1–0.9)
MONOCYTES NFR BLD AUTO: 8.5 % (ref 5–12)
NEUTROPHILS NFR BLD AUTO: 77.1 % (ref 42.7–76)
NEUTROPHILS NFR BLD AUTO: 9.52 10*3/MM3 (ref 1.7–7)
NRBC BLD AUTO-RTO: 0 /100 WBC (ref 0–0.2)
PLATELET # BLD AUTO: 266 10*3/MM3 (ref 140–450)
PMV BLD AUTO: 9.4 FL (ref 6–12)
POTASSIUM SERPL-SCNC: 3.4 MMOL/L (ref 3.5–5.2)
PROT SERPL-MCNC: 6.2 G/DL (ref 6–8.5)
RBC # BLD AUTO: 3.62 10*6/MM3 (ref 3.77–5.28)
REF LAB TEST METHOD: NORMAL
SODIUM SERPL-SCNC: 140 MMOL/L (ref 136–145)
WBC NRBC COR # BLD AUTO: 12.36 10*3/MM3 (ref 3.4–10.8)

## 2024-08-01 PROCEDURE — 25010000002 HEPARIN (PORCINE) PER 1000 UNITS: Performed by: SURGERY

## 2024-08-01 PROCEDURE — G0378 HOSPITAL OBSERVATION PER HR: HCPCS

## 2024-08-01 PROCEDURE — 25010000002 KETOROLAC TROMETHAMINE PER 15 MG: Performed by: SURGERY

## 2024-08-01 PROCEDURE — 25010000002 HYDROMORPHONE 1 MG/ML SOLUTION: Performed by: SURGERY

## 2024-08-01 PROCEDURE — 80053 COMPREHEN METABOLIC PANEL: CPT | Performed by: SURGERY

## 2024-08-01 PROCEDURE — 25010000002 PIPERACILLIN SOD-TAZOBACTAM PER 1 G: Performed by: SURGERY

## 2024-08-01 PROCEDURE — 85025 COMPLETE CBC W/AUTO DIFF WBC: CPT | Performed by: SURGERY

## 2024-08-01 RX ORDER — HYDROCODONE BITARTRATE AND ACETAMINOPHEN 7.5; 325 MG/1; MG/1
1 TABLET ORAL EVERY 4 HOURS PRN
Qty: 15 TABLET | Refills: 0 | Status: SHIPPED | OUTPATIENT
Start: 2024-08-01

## 2024-08-01 RX ADMIN — KETOROLAC TROMETHAMINE 15 MG: 30 INJECTION, SOLUTION INTRAMUSCULAR; INTRAVENOUS at 04:19

## 2024-08-01 RX ADMIN — PIPERACILLIN SODIUM AND TAZOBACTAM SODIUM 3.38 G: 3; .375 INJECTION, POWDER, LYOPHILIZED, FOR SOLUTION INTRAVENOUS at 00:00

## 2024-08-01 RX ADMIN — ACETAMINOPHEN 1000 MG: 500 TABLET, FILM COATED ORAL at 17:28

## 2024-08-01 RX ADMIN — HYDROMORPHONE HYDROCHLORIDE 0.5 MG: 1 INJECTION, SOLUTION INTRAMUSCULAR; INTRAVENOUS; SUBCUTANEOUS at 00:00

## 2024-08-01 RX ADMIN — HEPARIN SODIUM 5000 UNITS: 5000 INJECTION INTRAVENOUS; SUBCUTANEOUS at 14:04

## 2024-08-01 RX ADMIN — Medication 10 ML: at 11:27

## 2024-08-01 RX ADMIN — ACETAMINOPHEN 1000 MG: 500 TABLET, FILM COATED ORAL at 11:26

## 2024-08-01 RX ADMIN — PIPERACILLIN SODIUM AND TAZOBACTAM SODIUM 3.38 G: 3; .375 INJECTION, POWDER, LYOPHILIZED, FOR SOLUTION INTRAVENOUS at 07:55

## 2024-08-01 NOTE — DISCHARGE SUMMARY
Date of Discharge:  8/1/2024    Discharge Diagnosis: ***    Presenting Problem/History of Present Illness  Active Hospital Problems    Diagnosis  POA    Acute calculous cholecystitis [K80.00]  Yes      Resolved Hospital Problems    Diagnosis Date Resolved POA    **Acute cholecystitis [K81.0] 08/01/2024 Yes        ***  Hospital Course  Patient is a 44 y.o. female presented with ***.      Procedures Performed    Procedure(s):  CHOLECYSTECTOMY LAPAROSCOPIC WITH INTRAOPERATIVE CHOLANGIOGRAPHY  -------------------       Consults:   Consults       No orders found from 6/30/2024 to 7/30/2024.            Pertinent Test Results:   Results from last 7 days   Lab Units 08/01/24  0632 07/31/24  0649 07/30/24  0726   SODIUM mmol/L 140 138 140   POTASSIUM mmol/L 3.4* 3.6 3.7   CHLORIDE mmol/L 104 103 104   CO2 mmol/L 26.1 25.0 24.8   BUN mg/dL 8 10 11   CREATININE mg/dL 0.74 0.67 0.72   CALCIUM mg/dL 8.3* 8.3* 8.4*   BILIRUBIN mg/dL 1.5* 1.4* 1.7*   ALK PHOS U/L 114 96 88   ALT (SGPT) U/L 99* 121* 97*   AST (SGOT) U/L 43* 70* 85*   GLUCOSE mg/dL 88 109* 128*       Results from last 7 days   Lab Units 08/01/24  0632 07/31/24  0649 07/30/24  0726   WBC 10*3/mm3 12.36* 18.68* 16.38*   HEMOGLOBIN g/dL 10.1* 10.6* 11.4*   HEMATOCRIT % 31.9* 33.2* 34.7   PLATELETS 10*3/mm3 266 244 265           Condition on Discharge:  improved    Vital Signs  Temp:  [98.2 °F (36.8 °C)-99 °F (37.2 °C)] 98.4 °F (36.9 °C)  Heart Rate:  [] 98  Resp:  [16-18] 16  BP: ()/(61-71) 98/65    Physical Exam:   {General Physical Exam:26042}    Discharge Disposition      Discharge Medications     Discharge Medications        New Medications        Instructions Start Date   HYDROcodone-acetaminophen 7.5-325 MG per tablet  Commonly known as: NORCO   1 tablet, Oral, Every 4 Hours PRN             Continue These Medications        Instructions Start Date   Dexilant 60 MG capsule  Generic drug: dexlansoprazole   60 mg, Oral, Daily      lisinopril 20 MG  tablet  Commonly known as: PRINIVIL,ZESTRIL   20 mg, Oral, Daily      vitamin D 1.25 MG (16698 UT) capsule capsule  Commonly known as: ERGOCALCIFEROL   50,000 Units, Oral, Weekly, For 12 weeks               Discharge Diet:   Diet Instructions       Advance Diet As Tolerated -Target Diet: as tolerated      Target Diet: as tolerated            Activity at Discharge:   Activity Instructions       Discharge Activity      1) No driving while taking narcotic pain medications (i.e. lortab, vicodin, percocet, hydrocodone, oxycodone)   2) Return to work in as tolerated.  3) May shower.  No tub soaks, submersion in water or baths for 2 weeks.  4) Do not lift / push / pull/ tug more than 20 lbs x 4 weeks.            Follow-up Appointments  Future Appointments   Date Time Provider Department Center   11/26/2024  9:00 AM Carrie Manning PA [facility]     Additional Instructions for the Follow-ups that You Need to Schedule       Discharge Follow-up with Specialty: Dr. Cao; 1 Week   As directed      Specialty: Dr. Cao   Follow Up: 1 Week                Test Results Pending at Discharge  Pending Labs       Order Current Status    TISSUE EXAM, [facility]) In process             Reena Cao MD  08/01/24  12:34 EDT    Time: {Discharge Time Spent:97032}

## 2024-08-01 NOTE — CASE MANAGEMENT/SOCIAL WORK
DCP; Home with . Pt denies any needs.  or her brother will drive her home when she is discharged. CM continues to follow.

## 2024-08-01 NOTE — DISCHARGE INSTRUCTIONS
PATIENT INSTRUCTIONS  GALL BLADDER SURGERY  (CHOLECYSTECTOMY)    FOLLOW-UP: Please make an appointment with  in 1-2 weeks. Call immediately if you have any fevers greater than 102.5 degrees Fahrenheit, drainage from your wound that is not clear or looks infected, persistent bleeding, increasing abdominal pain, problems urinating, or persistent nausea/vomiting. You should be aware that you may have right shoulder pain after surgery and that this will progressively go away. This is called 'referred pain' and is from the area of the gallbladder. It can also be caused by gas that may be trapped under the diaphragm from the surgery, especially if it was performed laparoscopically through mini-incisions. This gas will progressively get reabsorbed by your body. A heating pad or hot compress can help with this discomfort.  Walking will also help the gas reabsorb more quickly.     WOUND CARE INSTRUCTIONS: The initial bandage may be removed after 48 hours. Steri strips (white tabs over the wounds) should be left in place and these will fall off on their own in 10-14 days. You may shower after 48 hours, but do not soak in the tub or be submerged underwater for 2 weeks.  If clothing rubs against the wound or causes irritation and the wound is not draining, you may cover it with a dry dressing during the daytime. Try to keep the wound dry and avoid ointments or peroxide on the wound unless directed to do so. If the wound becomes bright red and painful or starts to drain infected material that is not clear, please contact your physician immediately.   If the wound is mildly pink and has a thick firm ridge underneath it, this is normal and is referred to as a healing ridge. This will resolve over the next 4-6 weeks.    DIET: You may eat any foods that you can tolerate. It is a good idea to eat a low fat, high fiber diet, and take in plenty of fluids to prevent diarrhea/constipation. If you do become constipated, you may  want to take a stool softener (Colace), a mild laxative or take Dulcolax tablets on a daily basis until your bowel habits are regular.     ACTIVITY: You are encouraged to cough and take deep breaths, or if you were given one, use your incentive spirometer every 15-30 minutes when awake. This will help prevent respiratory complications and low grade fevers post-operatively. You may want to hug a pillow when coughing and sneezing to add additional support to the surgical area which will decrease pain during these times. You are encouraged to walk and engage in light activity for the next two weeks. You should not lift more than 20 pounds during this time frame as it could put you at increased risk for a post-operative hernia. Twenty pounds is roughly equivalent to a plastic bag of groceries.     MEDICATIONS: Try to take narcotic medications and anti-inflammatory medications, such as Mortin, Ibuprofen, Naprosyn, etc., with food. This will minimize stomach upset from the medication. Should you develop nausea and vomiting from the pain medication, or develop a rash, please discontinue the medication and contact your physician. You should not drive, make important decisions, or operate machinery when taking narcotic pain medication.    QUESTIONS: Please feel free to call Dr. Cao's office at [phone] if you have any questions, and they will be glad to assist you.  This number is answered 24 hours a day. After usual business hours, you will be connected to the answering service and they will contact Dr. Cao.

## 2024-08-01 NOTE — CASE MANAGEMENT/SOCIAL WORK
Case Management Discharge Note      Final Note: Pt discharged home with family via private car. No HH or DME needs at discharge.         Selected Continued Care - Admitted Since 7/29/2024       Destination    No services have been selected for the patient.                Durable Medical Equipment    No services have been selected for the patient.                Dialysis/Infusion    No services have been selected for the patient.                Home Medical Care    No services have been selected for the patient.                Therapy    No services have been selected for the patient.                Community Resources    No services have been selected for the patient.                Community & DME    No services have been selected for the patient.                    Transportation Services  Private: Car    Final Discharge Disposition Code: 01 - home or self-care

## 2024-08-02 NOTE — PROGRESS NOTES
"Subjective   Janet Snell is a 44 y.o. female.   Chief Complaint   Patient presents with    Post-op Follow-up     Lap beti          History of Present Illness     Ms. Snell comes to the office today for a post up visit following a laparoscopic cholecystectomy for acute cholecystitis.  The final pathology report demonstrated acute and chronic cholecystitis as expected. She reports having a low grade fever for the first several post-operative days, but this has subsequently resolved. She does still have intermittent RUQ pain. She is tolerating a diet. She denies nausea or vomiting. She is having regular BMs.       The following portions of the patient's history were reviewed and updated as appropriate: allergies, current medications, past family history, past medical history, past social history, past surgical history, and problem list.    Review of Systems    Objective   /78   Pulse 77   Temp 97.7 °F (36.5 °C)   Ht 152.4 cm (60\")   Wt 87.8 kg (193 lb 9.6 oz)   SpO2 98%   BMI 37.81 kg/m²     Physical Exam  Constitutional:       Appearance: She is well-developed.   HENT:      Head: Normocephalic and atraumatic.   Cardiovascular:      Rate and Rhythm: Regular rhythm.   Pulmonary:      Effort: Pulmonary effort is normal.   Abdominal:      General: There is no distension.      Palpations: Abdomen is soft.      Tenderness: There is no abdominal tenderness.      Comments: Port site incisions healing well.    Skin:     General: Skin is warm and dry.   Neurological:      Mental Status: She is alert and oriented to person, place, and time.   Psychiatric:         Behavior: Behavior normal.         Reference Lab [facility]  [address] [place] [address]  Phone: [phone] or [phone]  Fax: [phone]  Mario Zamora M.D., Medical Director    PATIENT NAME                           LABORATORY NO.  [id number]  JANET SNELL.                 " "[id number]  [id number]                         AGE              SEX  SSN           CLIENT REF #  [facility]            44      1980  F    [id number]   [id number]    [address] [place] [address] BY-PASS                REQUESTING M.D.     ATTENDING M.D.     COPY TO.  [address]                        [facility], [place] [address]                 DATE COLLECTED      DATE RECEIVED      DATE REPORTED  2024    DIAGNOSIS:  GALLBLADDER:  Acute and chronic cholecystitis  Negative for dysplasia or carcinoma    CLINICAL HISTORY:  Acute calculous cholecystitis    SPECIMENS RECEIVED:  GALLBLADDER    MICROSCOPIC DESCRIPTION:  Tissue blocks are prepared and slides are examined microscopically on all  specimens. See diagnosis for details.    Professional interpretation rendered by Blaze Krishnan M.D.,JOEY at [facility], [address] [place] [address].    GROSS DESCRIPTION:  Received in formalin labeled \"gallbladder with contents\" is an 8.9 x 4.2 x 2.8 cm  previously disrupted gallbladder with a clipped cystic duct.  The serosa is tan-  white to dark purple, smooth, and focally hemorrhagic.  The adventitia is tan and  shaggy.  Opening reveals multiple tan-brown, firm, multifaceted choleliths that  range from 0.4 cm to 0.6 cm in greatest dimension.  The mucosa is tan-green to  brown and velvety with a wall thickness that averages 0.1 cm.  No distinct  lesions or masses are identified, and representative sections from the fundus,  body, and neck to include the cystic duct margin (en face) are submitted in  cassette A1.  [facility]    REVIEWED, DIAGNOSED AND ELECTRONICALLY  SIGNED BY:    Blaze Krishnan M.D.,JOSE A.A.P.  CPT CODES:  91501     Assessment & Plan   Diagnoses and all orders for this visit:    1. S/P laparoscopic cholecystectomy (Primary)    2. Elevated LFTs  -     Comprehensive Metabolic Panel; Future    3. Anemia, unspecified type  -     CBC & " Differential; Future      I had the pleasure of seeing Linda Snell in follow-up today for the first  postoperative visit following laparoscopic cholecystectomy for acute cholecystitis.  Overall, Linda Snell  is enjoying uncomplicated recovery.  I do not anticipate any ongoing surgical issues and will return the patient back to the care of their PCP.  I have released Linda Snell to unrestricted physical activity beginning four weeks after her operative date. Plan to check repeat CBC and CMP in 10-14 days to ensure normalization. The patient may follow up in this office as needed.

## 2024-08-08 ENCOUNTER — OFFICE VISIT (OUTPATIENT)
Dept: SURGERY | Facility: CLINIC | Age: 44
End: 2024-08-08
Payer: COMMERCIAL

## 2024-08-08 VITALS
DIASTOLIC BLOOD PRESSURE: 78 MMHG | HEART RATE: 77 BPM | BODY MASS INDEX: 38.01 KG/M2 | SYSTOLIC BLOOD PRESSURE: 100 MMHG | OXYGEN SATURATION: 98 % | WEIGHT: 193.6 LBS | HEIGHT: 60 IN | TEMPERATURE: 97.7 F

## 2024-08-08 DIAGNOSIS — Z90.49 S/P LAPAROSCOPIC CHOLECYSTECTOMY: Primary | ICD-10-CM

## 2024-08-08 DIAGNOSIS — R79.89 ELEVATED LFTS: ICD-10-CM

## 2024-08-08 DIAGNOSIS — D64.9 ANEMIA, UNSPECIFIED TYPE: ICD-10-CM

## 2024-08-08 PROCEDURE — 99024 POSTOP FOLLOW-UP VISIT: CPT | Performed by: SURGERY

## 2024-08-08 RX ORDER — SUCRALFATE ORAL 1 G/10ML
SUSPENSION ORAL
COMMUNITY
Start: 2024-06-12 | End: 2024-08-21 | Stop reason: ALTCHOICE

## 2024-08-08 RX ORDER — METHIMAZOLE 10 MG/1
TABLET ORAL
COMMUNITY
End: 2024-08-21 | Stop reason: ALTCHOICE

## 2024-08-08 RX ORDER — SPIRONOLACTONE 25 MG/1
TABLET ORAL DAILY
COMMUNITY
End: 2024-08-21 | Stop reason: ALTCHOICE

## 2024-08-08 RX ORDER — MONTELUKAST SODIUM 10 MG/1
1 TABLET ORAL DAILY
COMMUNITY
End: 2024-08-21 | Stop reason: ALTCHOICE

## 2024-08-08 RX ORDER — KETOCONAZOLE 20 MG/G
CREAM TOPICAL
COMMUNITY
Start: 2024-07-05

## 2024-09-18 ENCOUNTER — TRANSCRIBE ORDERS (OUTPATIENT)
Dept: ADMINISTRATIVE | Facility: HOSPITAL | Age: 44
End: 2024-09-18
Payer: COMMERCIAL

## 2024-09-18 DIAGNOSIS — Z12.31 VISIT FOR SCREENING MAMMOGRAM: Primary | ICD-10-CM

## 2024-10-08 LAB
NCCN CRITERIA FLAG: NORMAL
TYRER CUZICK SCORE: 10.7

## 2024-11-02 ENCOUNTER — HOSPITAL ENCOUNTER (OUTPATIENT)
Dept: MAMMOGRAPHY | Facility: HOSPITAL | Age: 44
Discharge: HOME | End: 2024-11-02
Admitting: ADVANCED PRACTICE MIDWIFE
Payer: COMMERCIAL

## 2024-11-02 DIAGNOSIS — Z12.31 VISIT FOR SCREENING MAMMOGRAM: ICD-10-CM

## 2024-11-02 PROCEDURE — 77063 BREAST TOMOSYNTHESIS BI: CPT

## 2024-11-02 PROCEDURE — 77067 SCR MAMMO BI INCL CAD: CPT

## 2025-03-18 ENCOUNTER — OFFICE VISIT (OUTPATIENT)
Dept: INTERNAL MEDICINE | Facility: CLINIC | Age: 45
End: 2025-03-18
Payer: COMMERCIAL

## 2025-03-18 VITALS
HEART RATE: 83 BPM | HEIGHT: 60 IN | BODY MASS INDEX: 40.25 KG/M2 | TEMPERATURE: 98.1 F | OXYGEN SATURATION: 97 % | WEIGHT: 205 LBS | SYSTOLIC BLOOD PRESSURE: 126 MMHG | DIASTOLIC BLOOD PRESSURE: 82 MMHG

## 2025-03-18 DIAGNOSIS — R53.82 CHRONIC FATIGUE: ICD-10-CM

## 2025-03-18 DIAGNOSIS — R63.5 WEIGHT GAIN: ICD-10-CM

## 2025-03-18 DIAGNOSIS — M25.512 CHRONIC PAIN OF BOTH SHOULDERS: ICD-10-CM

## 2025-03-18 DIAGNOSIS — Z87.11 HX OF GASTRIC ULCER: ICD-10-CM

## 2025-03-18 DIAGNOSIS — R73.03 PREDIABETES: ICD-10-CM

## 2025-03-18 DIAGNOSIS — R10.13 EPIGASTRIC PAIN: ICD-10-CM

## 2025-03-18 DIAGNOSIS — I10 PRIMARY HYPERTENSION: ICD-10-CM

## 2025-03-18 DIAGNOSIS — M25.511 CHRONIC PAIN OF BOTH SHOULDERS: ICD-10-CM

## 2025-03-18 DIAGNOSIS — L65.9 HAIR LOSS: ICD-10-CM

## 2025-03-18 DIAGNOSIS — Z76.89 ENCOUNTER TO ESTABLISH CARE: Primary | ICD-10-CM

## 2025-03-18 DIAGNOSIS — G47.9 SLEEP DISORDER: ICD-10-CM

## 2025-03-18 DIAGNOSIS — G89.29 CHRONIC PAIN OF BOTH SHOULDERS: ICD-10-CM

## 2025-03-18 DIAGNOSIS — E55.9 VITAMIN D DEFICIENCY: ICD-10-CM

## 2025-03-18 RX ORDER — LOSARTAN POTASSIUM 50 MG/1
1 TABLET ORAL DAILY
COMMUNITY
Start: 2025-02-13

## 2025-03-18 NOTE — PROGRESS NOTES
"      Female Physical Note      Date: 2025   Patient Name: Linda Snell  : 1980   MRN: [id number]     Chief Complaint:    Chief Complaint   Patient presents with    [facility]     With Luzmaria today.     Abdominal Pain     And tightness when coughing, sneezing, sitting up     Abstract     Would like to see PT for bilateral shoulder pain/issues        History of Present Illness: Linda Snell is a 44 y.o. female presenting to establish care.  [facility]  History of hypertension, on losartan 50 mg daily was switched from lisinopril to this recently.  No chest pain, shortness of air, visual changes, headaches, blood pressure is 126/82 today.  She has a history of chronic low vitamin D and has had to continue the once a week high-dose vitamin D supplement.  Labs were last checked in December with low vitamin D, TSH normal, A1c 5.7%, CBC and CMP normal.  She has issues with hair loss, weight gain, chronic fatigue and would like a more extensive lab panel checked including cortisol levels.  Had gallbladder removed last year.  Shortly after she was released for tub baths, she was in the tub and sat up and felt a sharp \"charley horse\" type pain in her epigastric and right upper quadrant region.  Did not follow back up with surgeon or be seen for the problem, brushed it off to just having surgery and hoped it resolved.  Since then she has had pain intermittently with coughing, sneezing, sitting up from lying down.  She also notes pain after eating a heavy meal in her epigastric region and her abdomen will feel firm to touch in that region after the meal.  She has history of obtaining EGD with 2 healing ulcers noted with [facility], was put on PPI therapy and treated for GERD for over 2 years before finding out she had a gallbladder problem.  She believes she has been tested for H. pylori 2 times previously and it was negative.  No nausea, vomiting, constipation, " heartburn, reflux, trouble swallowing, hoarseness or sore throat.  She does have occasional episodes of diarrhea depending on what she eats since having gallbladder removed.  Has chronic pain in both shoulders that has been ongoing for a few years.  She has a history of falling and landing on her shoulders.  She has limited range of motion of both shoulders along with pain.  She has never had this evaluated by orthopedics or been to physical therapy.  She is requesting a referral to physical therapy.    Subjective      Review of Systems:   Pertinent ROS in HPI    Past Medical History, Social History, Family History and Care Team were all reviewed with patient and updated as appropriate.     Medications:     Current Outpatient Medications:     losartan (COZAAR) 50 MG tablet, Take 1 tablet by mouth Daily., Disp: , Rfl:     vitamin D (ERGOCALCIFEROL) 1.25 MG (51156 UT) capsule capsule, Take 1 capsule by mouth 1 (One) Time Per Week. For 12 weeks, Disp: 12 capsule, Rfl: 0    ketoconazole (NIZORAL) 2 % cream, APPLY TO THE AFFECTED AREA(S) BY TOPICAL ROUTE TWICE PER DAY FOR 2 WEEKS, Disp: , Rfl:     Allergies:   Allergies   Allergen Reactions    Sulfamethoxazole-Trimethoprim Unknown - Low Severity    Adhesive Tape Rash    Propylthiouracil Rash    Shellfish-Derived Products Rash       Immunizations:  Immunization History   Administered Date(s) Administered    COVID-19 (PFIZER) Purple Cap Monovalent 08/31/2021, 09/24/2021    Hep B, Adolescent or Pediatric 06/11/2010, 10/15/2010, 04/27/2011    Hepatitis A 11/05/2018    MMR 02/17/2010, 04/27/2011    Tdap 06/11/2010, 01/29/2025    Varicella 02/17/2010       Colorectal Screening:   Last Completed Colonoscopy    This patient has no relevant Health Maintenance data.       Pap:   Last Completed Pap Smear            Upcoming       PAP SMEAR (Every 3 Years) Next due on 1/23/2026 01/23/2023  Patient-Reported (Performed Externally)                           Mammogram:   Last  "Completed Mammogram            Upcoming       MAMMOGRAM (Every 2 Years) Next due on 11/2/2026 11/02/2024  Mammo Screening Digital Tomosynthesis Bilateral With CAD    10/04/2022  Mammo Screening Digital Tomosynthesis Bilateral With CAD    03/24/2021  Mammo Diagnostic Digital Tomosynthesis Left With CAD    03/08/2021  Mammo Screening Digital Tomosynthesis Bilateral With CAD                               Tobacco Use: Low Risk  (3/18/2025)    Patient History     Smoking Tobacco Use: Never     Smokeless Tobacco Use: Never     Passive Exposure: Not on file       Social History     Substance and Sexual Activity   Alcohol Use Never        Social History     Substance and Sexual Activity   Drug Use Never      PHQ-2 Depression Screening  Little interest or pleasure in doing things? Not at all   Feeling down, depressed, or hopeless? Not at all   PHQ-2 Total Score 0     Objective     Vital Signs:   Vitals:    03/18/25 1106   BP: 126/82   Pulse: 83   Temp: 98.1 °F (36.7 °C)   SpO2: 97%   Weight: 93 kg (205 lb)   Height: 152.4 cm (60\")   PainSc: 0-No pain       Physical Exam  Vitals and nursing note reviewed.   Constitutional:       Appearance: Normal appearance. She is obese.   HENT:      Head: Normocephalic and atraumatic.      Right Ear: Tympanic membrane, ear canal and external ear normal.      Left Ear: Tympanic membrane, ear canal and external ear normal.      Nose: Nose normal.      Mouth/Throat:      Mouth: Mucous membranes are moist.      Pharynx: Oropharynx is clear.   Eyes:      General: No scleral icterus.     Extraocular Movements: Extraocular movements intact.      Conjunctiva/sclera: Conjunctivae normal.      Pupils: Pupils are equal, round, and reactive to light.   Neck:      Thyroid: No thyromegaly.   Cardiovascular:      Rate and Rhythm: Normal rate and regular rhythm.      Heart sounds: Normal heart sounds.   Pulmonary:      Effort: Pulmonary effort is normal.      Breath sounds: Normal breath sounds. "   Abdominal:      General: Bowel sounds are normal. There is no distension.      Palpations: Abdomen is soft. There is no mass.      Tenderness: There is no abdominal tenderness. There is no guarding or rebound.      Hernia: No hernia is present.      Comments: 4 scars noted to abdomen from previous cholecystectomy   Musculoskeletal:         General: Normal range of motion.      Cervical back: Normal range of motion and neck supple.   Lymphadenopathy:      Cervical: No cervical adenopathy.   Skin:     General: Skin is warm and dry.      Findings: No rash.   Neurological:      General: No focal deficit present.      Mental Status: She is alert and oriented to person, place, and time.      Cranial Nerves: No cranial nerve deficit.      Sensory: No sensory deficit.      Motor: No weakness.      Coordination: Coordination normal.      Gait: Gait normal.   Psychiatric:         Mood and Affect: Mood normal.         Behavior: Behavior normal.         Thought Content: Thought content normal.         Assessment / Plan      Assessment/Plan:   Diagnoses and all orders for this visit:    1. Encounter to establish care (Primary)    2. Primary hypertension  -     CBC (No Diff)  -     Comprehensive Metabolic Panel  -     Lipid Panel  -     TSH Rfx On Abnormal To Free T4    3. Prediabetes  -     Comprehensive Metabolic Panel  -     Hemoglobin A1c    4. Vitamin D deficiency  -     Vitamin D,25-Hydroxy    5. Epigastric pain  -     CBC (No Diff)  -     Comprehensive Metabolic Panel  -     TSH Rfx On Abnormal To Free T4  -     Hemoglobin A1c  -     H. Pylori Breath Test - Breath, Lung  -     Lipase    6. Hx of gastric ulcer  -     H. Pylori Breath Test - Breath, Lung    7. Hair loss  -     CBC (No Diff)  -     Comprehensive Metabolic Panel  -     TSH Rfx On Abnormal To Free T4  -     Hemoglobin A1c  -     Vitamin D,25-Hydroxy  -     Vitamin B12  -     Cortisol - AM  -     Iron and TIBC  -     Ferritin    8. Weight gain  -     CBC (No  Diff)  -     Comprehensive Metabolic Panel  -     TSH Rfx On Abnormal To Free T4  -     Hemoglobin A1c  -     Vitamin D,25-Hydroxy  -     Vitamin B12  -     Cortisol - AM  -     Iron and TIBC  -     Ferritin    9. Chronic fatigue  -     CBC (No Diff)  -     Comprehensive Metabolic Panel  -     TSH Rfx On Abnormal To Free T4  -     Hemoglobin A1c  -     Vitamin D,25-Hydroxy  -     Vitamin B12  -     Cortisol - AM  -     Iron and TIBC  -     Ferritin    10. Sleep disorder  -     CBC (No Diff)  -     Comprehensive Metabolic Panel  -     TSH Rfx On Abnormal To Free T4  -     Hemoglobin A1c  -     Vitamin D,25-Hydroxy  -     Vitamin B12  -     Cortisol - AM    11. Chronic pain of both shoulders  -     Ambulatory Referral to Physical Therapy for Evaluation & Treatment       Assessment & Plan  Hypertension-stable.  Continue losartan, heart healthy diet, 150 minutes of exercise per week, home blood pressure monitoring.  Goal is less than 130/80.  Prediabetes-last A1c 5.7%, continue monitoring, eat a high-protein, low-carb diet and eliminate sugars, get regular exercise  Update vitamin D level continue weekly vitamin D supplement  Epigastric pain, history of gastric ulcer-symptoms seem to be  musculoskeletal or potentially due to scar tissue from previous surgery.  We discussed following up with Dr. Cao who performed her surgery since the issue occurred after the surgery and has progressed.  Could also consider following back up with [facility] as she is going to be due a colonoscopy soon anyway when she turns 45 to do an EGD/colonoscopy.  Recommend to eat small frequent meals.  Follow reflux measures/diet.  Will check labs and H. pylori.  Can consider abdominal imaging if no improvement.  Labs to further evaluate hair loss, weight gain, fatigue, sleep problems  Referral to physical therapy for chronic pain of both shoulders.  Go to physical therapy for at least 6 weeks and if no improvement would recommend a referral to  orthopedics.    Class 3 Severe Obesity (BMI >=40). Obesity-related health conditions include the following: hypertension, diabetes mellitus, dyslipidemias, and GERD. Obesity is newly identified. BMI is is above average; BMI management plan is completed. We discussed portion control and increasing exercise.      Linda Snell voices understanding and acceptance of this advice and will call back with any further questions or concerns. AVS with preventive healthcare tips printed for patient.     Follow Up:   3 months, sooner SEBASTIAN Huffman  [facility] ZUÑIGA spent 47 minutes caring for Linda Snell on this date of service. This time includes time spent by me in the following activities: preparing for the visit, reviewing tests, performing a medically appropriate examination and/or evaluation, counseling and educating the patient/family/caregiver, ordering medications, tests, or procedures and documenting information in the medical record.

## 2025-03-23 LAB
25(OH)D3+25(OH)D2 SERPL-MCNC: 15.6 NG/ML (ref 30–100)
ALBUMIN SERPL-MCNC: 4.4 G/DL (ref 3.9–4.9)
ALP SERPL-CCNC: 89 IU/L (ref 44–121)
ALT SERPL-CCNC: 31 IU/L (ref 0–32)
AST SERPL-CCNC: 19 IU/L (ref 0–40)
BILIRUB SERPL-MCNC: 0.7 MG/DL (ref 0–1.2)
BUN SERPL-MCNC: 11 MG/DL (ref 6–24)
BUN/CREAT SERPL: 15 (ref 9–23)
CALCIUM SERPL-MCNC: 8.8 MG/DL (ref 8.7–10.2)
CHLORIDE SERPL-SCNC: 102 MMOL/L (ref 96–106)
CHOLEST SERPL-MCNC: 187 MG/DL (ref 100–199)
CO2 SERPL-SCNC: 21 MMOL/L (ref 20–29)
CORTIS AM PEAK SERPL-MCNC: 16 UG/DL (ref 6.2–19.4)
CREAT SERPL-MCNC: 0.72 MG/DL (ref 0.57–1)
EGFRCR SERPLBLD CKD-EPI 2021: 106 ML/MIN/1.73
ERYTHROCYTE [DISTWIDTH] IN BLOOD BY AUTOMATED COUNT: 12.6 % (ref 11.7–15.4)
FERRITIN SERPL-MCNC: 49 NG/ML (ref 15–150)
GLOBULIN SER CALC-MCNC: 2.4 G/DL (ref 1.5–4.5)
GLUCOSE SERPL-MCNC: 95 MG/DL (ref 70–99)
HBA1C MFR BLD: 5.8 % (ref 4.8–5.6)
HCT VFR BLD AUTO: 43.1 % (ref 34–46.6)
HDLC SERPL-MCNC: 36 MG/DL
HGB BLD-MCNC: 14.2 G/DL (ref 11.1–15.9)
IRON SATN MFR SERPL: 24 % (ref 15–55)
IRON SERPL-MCNC: 86 UG/DL (ref 27–159)
LDLC SERPL CALC-MCNC: 125 MG/DL (ref 0–99)
LIPASE SERPL-CCNC: 25 U/L (ref 14–72)
MCH RBC QN AUTO: 28.6 PG (ref 26.6–33)
MCHC RBC AUTO-ENTMCNC: 32.9 G/DL (ref 31.5–35.7)
MCV RBC AUTO: 87 FL (ref 79–97)
PLATELET # BLD AUTO: 374 X10E3/UL (ref 150–450)
POTASSIUM SERPL-SCNC: 4.1 MMOL/L (ref 3.5–5.2)
PROT SERPL-MCNC: 6.8 G/DL (ref 6–8.5)
RBC # BLD AUTO: 4.96 X10E6/UL (ref 3.77–5.28)
SODIUM SERPL-SCNC: 137 MMOL/L (ref 134–144)
TIBC SERPL-MCNC: 357 UG/DL (ref 250–450)
TRIGL SERPL-MCNC: 143 MG/DL (ref 0–149)
TSH SERPL DL<=0.005 MIU/L-ACNC: 1.72 UIU/ML (ref 0.45–4.5)
UIBC SERPL-MCNC: 271 UG/DL (ref 131–425)
UREA BREATH TEST QL: NEGATIVE
VIT B12 SERPL-MCNC: 259 PG/ML (ref 232–1245)
VLDLC SERPL CALC-MCNC: 26 MG/DL (ref 5–40)
WBC # BLD AUTO: 6.7 X10E3/UL (ref 3.4–10.8)

## 2025-04-01 ENCOUNTER — TREATMENT (OUTPATIENT)
Dept: PHYSICAL THERAPY | Facility: CLINIC | Age: 45
End: 2025-04-01
Payer: COMMERCIAL

## 2025-04-01 DIAGNOSIS — G89.29 CHRONIC PAIN OF BOTH SHOULDERS: Primary | ICD-10-CM

## 2025-04-01 DIAGNOSIS — M25.512 CHRONIC PAIN OF BOTH SHOULDERS: Primary | ICD-10-CM

## 2025-04-01 DIAGNOSIS — M25.511 CHRONIC PAIN OF BOTH SHOULDERS: Primary | ICD-10-CM

## 2025-04-01 PROCEDURE — 97161 PT EVAL LOW COMPLEX 20 MIN: CPT | Performed by: PHYSICAL THERAPIST

## 2025-04-01 PROCEDURE — 97530 THERAPEUTIC ACTIVITIES: CPT | Performed by: PHYSICAL THERAPIST

## 2025-04-01 NOTE — PROGRESS NOTES
Physical Therapy Initial Evaluation and Plan of Care   [address] [place] [address]      Patient: Linda Snell   : 1980  Diagnosis/ICD-10 Code:  Chronic pain of both shoulders [M25.511, G89.29, M25.512]  Referring practitioner: SEBASTIAN Rojo    Subjective Evaluation    History of Present Illness  Date of onset: 2024  Mechanism of injury: Pt reports that over that last several years she has fallen on bother shoulders. Pt reports that her pain has gotten worse over the last year. Pt reports that external rotation and flexion overhead causes pain in both shoulders. Pt reports that her R hand gets numb when laying on it at night and when doing repetitive motions. Pt reports that things like doing her hair and getting things off of shelves cause pain. Pt reports that changing positions helps relieve the pain and numbness.       Patient Occupation: Desk Job Pain  Current pain ratin  At worst pain rating: 10  Quality: sharp, tight, dull ache and burning  Relieving factors: change in position  Aggravating factors: sleeping, movement, overhead activity and lifting  Progression: worsening    Hand dominance: right    Diagnostic Tests  No diagnostic tests performed    Patient Goals  Patient goals for therapy: decreased pain, increased motion and increased strength           Objective          Palpation   Left   Hypertonic in the levator scapulae and upper trapezius.     Right   Hypertonic in the levator scapulae and upper trapezius.     Tenderness     Left Shoulder   Tenderness in the biceps tendon (proximal) and supraspinatus tendon.     Right Shoulder  Tenderness in the supraspinatus tendon. No tenderness in the biceps tendon (proximal).     Neurological Testing     Reflexes   Left   Biceps (C5/C6): normal (2+)  Brachioradialis (C6): normal (2+)  Triceps (C7): normal (2+)    Right   Biceps (C5/C6): normal (2+)  Brachioradialis (C6): normal (2+)  Triceps (C7): normal  (2+)    Active Range of Motion   Left Shoulder   Flexion: 125 degrees   Abduction: 105 degrees     Right Shoulder   Flexion: 145 degrees   Abduction: 125 degrees     Passive Range of Motion   Left Shoulder   Flexion: 149 degrees   Abduction: 125 degrees   External rotation 90°: 72 degrees   Internal rotation 90°: 86 degrees     Right Shoulder   Flexion: 165 degrees   Abduction: 178 degrees   External rotation 90°: 78 degrees   Internal rotation 90°: 72 degrees     Strength/Myotome Testing     Left Shoulder     Planes of Motion   External rotation at 0°: 4   Internal rotation at 0°: 5     Right Shoulder     Planes of Motion   External rotation at 0°: 4+   Internal rotation at 0°: 5     Tests     Left Shoulder   Positive Hawkin's, Neer's and Speed's.     Right Shoulder   Positive Hawkin's and Neer's.   Negative Speed's.           Assessment & Plan       Assessment  Impairments: abnormal muscle firing, abnormal muscle tone, abnormal or restricted ROM, activity intolerance, impaired physical strength, lacks appropriate home exercise program and pain with function   Functional limitations: carrying objects, lifting, sleeping, pulling, pushing, uncomfortable because of pain, reaching behind back and reaching overhead   Assessment details: Patient is a 44 year old female who comes to physical therapy with chronic B shoulder pain. Signs and symptoms are consistent with impingement of the supraspinatus tendons in the L and R shoulder with long head of the biceps tendon involvement on the L side resulting in pain, decreased ROM, decreased strength, and inability to perform all essential functional activities. Pt will benefit from skilled PT services to address the above issues.       Prognosis: good    Goals  Plan Goals: SHORT TERM GOALS:     2 weeks  1. Pt I w/ HEP  2. Pt to demonstrate PROM of the bilateral shoulder to WFL to improve ability to perform ADL's  3. Pt to demonstrate ability to perform 30 minutes continuous  activity in the clinic without increase in pain     LONG TERM GOALS:   6 weeks  1. Pt to demonstrate AROM of the bilateral shoulder to WNL to allow ability to perform all necessary functional activities  2. Pt to demonstrate ability to lift 5# OH with the bilateral arm without increase in pain  3. Pt to report being able to work full duty without increase in pain in the shoulder  4. Pt to tolerate 60 minutes continuous activity in the clinic without increase in pain         Plan  Therapy options: will be seen for skilled therapy services  Planned modality interventions: cryotherapy and thermotherapy (hydrocollator packs)  Planned therapy interventions: body mechanics training, flexibility, functional ROM exercises, home exercise program, joint mobilization, manual therapy, motor coordination training, neuromuscular re-education, postural training, soft tissue mobilization, spinal/joint mobilization, strengthening, stretching and therapeutic activities  Frequency: 2x week  Duration in weeks: 6  Treatment plan discussed with: patient        Timed Treatment:  Manual Therapy:         mins  98265;  Therapeutic Exercise:         mins  14472;     Neuromuscular Susan:        mins  16512;    Therapeutic Activity:     11     mins  32325;     Gait Training:           mins  57808;     Ultrasound:          mins  28424;    Electrical Stimulation:         mins  58325 ( );  Dry Needling          mins self-pay  Iontophoresis          mins 58677    Untimed Treatments:  Electrical Stimulation:         mins  91622 ( );  Dry Needling:                     mins  Ultrasound:                         mins  65452;      Timed Treatment:   11   mins   Total Treatment:     48   mins    PT SIGNATURE: NIKA Dang License: [id number]  DATE TREATMENT INITIATED: 4/1/2025    Initial Certification  Certification Period: 6/29/2025  I certify that the therapy services are furnished while this patient is under my care.  The services  outlined above are required by this patient, and will be reviewed every 90 days.     PHYSICIAN: Luzmaria White, APRN      DATE:     Please sign and return via fax to [phone].. Thank you, [facility].

## 2025-04-08 ENCOUNTER — TREATMENT (OUTPATIENT)
Dept: PHYSICAL THERAPY | Facility: CLINIC | Age: 45
End: 2025-04-08
Payer: COMMERCIAL

## 2025-04-08 DIAGNOSIS — M25.511 CHRONIC PAIN OF BOTH SHOULDERS: Primary | ICD-10-CM

## 2025-04-08 DIAGNOSIS — M25.512 CHRONIC PAIN OF BOTH SHOULDERS: Primary | ICD-10-CM

## 2025-04-08 DIAGNOSIS — G89.29 CHRONIC PAIN OF BOTH SHOULDERS: Primary | ICD-10-CM

## 2025-04-08 PROCEDURE — 97110 THERAPEUTIC EXERCISES: CPT | Performed by: PHYSICAL THERAPIST

## 2025-04-08 PROCEDURE — 97140 MANUAL THERAPY 1/> REGIONS: CPT | Performed by: PHYSICAL THERAPIST

## 2025-04-08 PROCEDURE — 97112 NEUROMUSCULAR REEDUCATION: CPT | Performed by: PHYSICAL THERAPIST

## 2025-04-10 NOTE — PROGRESS NOTES
Physical Therapy Daily Treatment Note      Visit #: 2    Linda Snell reports that her shoulders continue to have pain. Pt reports that she has not noticed much difference in the shoulders at this time.         Objective Pt present to PT today with no distress at rest.     Pt with no increased pain in the shoulders with activities in the clinic today.     Pt responded well to mobilization of the shoulders by PT.       See Exercise, Manual, and Modality Logs for complete treatment.     Assessment/Plan  Pt continues to have pain in the shoulders with limited mobility and function due to pain. Pt to continue with PT to help improve pain, mobility, and daily activity tolerance.       Progress per Plan of Care      Visit Diagnosis:    ICD-10-CM ICD-9-CM   1. Chronic pain of both shoulders  M25.511 719.41    G89.29 338.29    M25.512               Timed Treatment:  Manual Therapy:    15     mins  48933;  Therapeutic Exercise:    20     mins  18666;     Neuromuscular Susan:    10    mins  10906;    Therapeutic Activity:          mins  47686;     Gait Training:           mins  70368;     Ultrasound:          mins  56278;    Electrical Stimulation:         mins  43321 ( );  Dry Needling          mins self-pay  Iontophoresis          mins 92952    Untimed Treatments:  Electrical Stimulation:         mins  96270 (MC );  Dry Needling:                     mins  Ultrasound:                         mins  38324;        Timed Treatment:   45   mins   Total Treatment:     45   mins    Samuel Francisco, PT  Physical Therapist

## 2025-04-11 ENCOUNTER — TREATMENT (OUTPATIENT)
Dept: PHYSICAL THERAPY | Facility: CLINIC | Age: 45
End: 2025-04-11
Payer: COMMERCIAL

## 2025-04-11 DIAGNOSIS — M25.512 CHRONIC PAIN OF BOTH SHOULDERS: Primary | ICD-10-CM

## 2025-04-11 DIAGNOSIS — G89.29 CHRONIC PAIN OF BOTH SHOULDERS: Primary | ICD-10-CM

## 2025-04-11 DIAGNOSIS — M25.511 CHRONIC PAIN OF BOTH SHOULDERS: Primary | ICD-10-CM

## 2025-04-11 PROCEDURE — 97112 NEUROMUSCULAR REEDUCATION: CPT | Performed by: PHYSICAL THERAPIST

## 2025-04-11 PROCEDURE — 97110 THERAPEUTIC EXERCISES: CPT | Performed by: PHYSICAL THERAPIST

## 2025-04-11 PROCEDURE — 97140 MANUAL THERAPY 1/> REGIONS: CPT | Performed by: PHYSICAL THERAPIST

## 2025-04-11 NOTE — PROGRESS NOTES
Physical Therapy Daily Treatment Note      Visit #: 3    Linda Snell reports 3/10 pain today at rest.  Pt reports that she was less sore after last session compared to when she first started her HEP. Pt reports that her shoulder hurt more when she goes to reach up or out.         Objective Pt present to PT today with no distress at rest.     Pt educated on brachial plexus pathway.     Pt with no increased pain in the shoulders with activities in the clinic today.       See Exercise, Manual, and Modality Logs for complete treatment.     Assessment/Plan  Pt is responding well to activities in the clinic to help improve scapular mobility and pain free shoulder function. Pt to continue with PT to help improve pain in the shoulders with daily activities and function.       Progress per Plan of Care      Visit Diagnosis:    ICD-10-CM ICD-9-CM   1. Chronic pain of both shoulders  M25.511 719.41    G89.29 338.29    M25.512               Timed Treatment:  Manual Therapy:    15     mins  97660;  Therapeutic Exercise:    13     mins  41608;     Neuromuscular Susan:    10    mins  90505;    Therapeutic Activity:          mins  41190;     Gait Training:           mins  32288;     Ultrasound:          mins  74575;    Electrical Stimulation:         mins  60825 ( );  Dry Needling          mins self-pay  Iontophoresis          mins 91232    Untimed Treatments:  Electrical Stimulation:         mins  27795 (MC );  Dry Needling:                     mins  Ultrasound:                         mins  70719;        Timed Treatment:   38   mins   Total Treatment:     54   mins    Samuel Francisco, PT  Physical Therapist

## 2025-04-18 ENCOUNTER — TREATMENT (OUTPATIENT)
Dept: PHYSICAL THERAPY | Facility: CLINIC | Age: 45
End: 2025-04-18
Payer: COMMERCIAL

## 2025-04-18 DIAGNOSIS — M25.512 CHRONIC PAIN OF BOTH SHOULDERS: Primary | ICD-10-CM

## 2025-04-18 DIAGNOSIS — G89.29 CHRONIC PAIN OF BOTH SHOULDERS: Primary | ICD-10-CM

## 2025-04-18 DIAGNOSIS — M25.511 CHRONIC PAIN OF BOTH SHOULDERS: Primary | ICD-10-CM

## 2025-04-18 PROCEDURE — 97112 NEUROMUSCULAR REEDUCATION: CPT | Performed by: PHYSICAL THERAPIST

## 2025-04-18 PROCEDURE — 97110 THERAPEUTIC EXERCISES: CPT | Performed by: PHYSICAL THERAPIST

## 2025-04-18 PROCEDURE — 97140 MANUAL THERAPY 1/> REGIONS: CPT | Performed by: PHYSICAL THERAPIST

## 2025-04-21 NOTE — PROGRESS NOTES
Physical Therapy Daily Treatment Note      Visit #: 4    Linda Snell reports that she has noticed an improvement in the shoulders with improved pain with daily activities. Pt reports that she still has trouble during work and with a lot of use of the shoulders.         Objective Pt present to PT today with no distress at rest.     Pt with minimal tenderness in the L long head of biceps today.     Pt with improved L shoulder mobility and R shoulder mobility WFLs today.     Pt with no increased pain in the shoulders today with activities today.       See Exercise, Manual, and Modality Logs for complete treatment.     Assessment/Plan  Pt continues to show improvement in pain although continues to have rounded shoulders. Pt to continue with activities to improve shoulder pain, strength, and mobility and progress as tolerated.       Progress per Plan of Care      Visit Diagnosis:    ICD-10-CM ICD-9-CM   1. Chronic pain of both shoulders  M25.511 719.41    G89.29 338.29    M25.512               Timed Treatment:  Manual Therapy:    15     mins  73024;  Therapeutic Exercise:    20     mins  92851;     Neuromuscular Susan:    10    mins  62812;    Therapeutic Activity:          mins  03423;     Gait Training:           mins  76495;     Ultrasound:          mins  84235;    Electrical Stimulation:         mins  23615 ( );  Dry Needling          mins self-pay  Iontophoresis          mins 81245    Untimed Treatments:  Electrical Stimulation:         mins  87993 ( );  Dry Needling:                     mins  Ultrasound:                         mins  11895;        Timed Treatment:   45   mins   Total Treatment:     45   mins    Samuel Francisco, PT  Physical Therapist

## 2025-04-22 ENCOUNTER — TREATMENT (OUTPATIENT)
Dept: PHYSICAL THERAPY | Facility: CLINIC | Age: 45
End: 2025-04-22
Payer: COMMERCIAL

## 2025-04-22 DIAGNOSIS — M25.511 CHRONIC PAIN OF BOTH SHOULDERS: Primary | ICD-10-CM

## 2025-04-22 DIAGNOSIS — M25.512 CHRONIC PAIN OF BOTH SHOULDERS: Primary | ICD-10-CM

## 2025-04-22 DIAGNOSIS — G89.29 CHRONIC PAIN OF BOTH SHOULDERS: Primary | ICD-10-CM

## 2025-04-22 PROCEDURE — 97140 MANUAL THERAPY 1/> REGIONS: CPT | Performed by: PHYSICAL THERAPIST

## 2025-04-22 PROCEDURE — 97112 NEUROMUSCULAR REEDUCATION: CPT | Performed by: PHYSICAL THERAPIST

## 2025-04-22 PROCEDURE — 97110 THERAPEUTIC EXERCISES: CPT | Performed by: PHYSICAL THERAPIST

## 2025-04-24 NOTE — PROGRESS NOTES
Physical Therapy Daily Treatment Note      Visit #: 5    Linda Snell reports 0/10 pain today at rest.  Pt reports that her shoulder are feeling much better and the only time she has issues is when reaching overhead. Pt reports that her resting pain is much better. Pt states that she is going to try to start going to the gym to workout.        Objective Pt present to PT today with no distress at rest.     PT ran through workout activities to make sure pt was using correct form and not causing pain in the shoulders.    Pt with no increased pain in the shoulders with activities in the clinic today.       See Exercise, Manual, and Modality Logs for complete treatment.     Assessment/Plan  Pt continues to respond well to activities in the clinic and with HEP at home. Pt to follow up once more this week and begin to transition to independent care with HEP and the gym.       Progress per Plan of Care      Visit Diagnosis:    ICD-10-CM ICD-9-CM   1. Chronic pain of both shoulders  M25.511 719.41    G89.29 338.29    M25.512               Timed Treatment:  Manual Therapy:    15     mins  27891;  Therapeutic Exercise:    25     mins  23487;     Neuromuscular Susan:    16    mins  25451;    Therapeutic Activity:          mins  04037;     Gait Training:           mins  45633;     Ultrasound:          mins  18836;    Electrical Stimulation:         mins  21401 ( );  Dry Needling          mins self-pay  Iontophoresis          mins 89309    Untimed Treatments:  Electrical Stimulation:         mins  86370 (MC );  Dry Needling:                     mins  Ultrasound:                         mins  37920;        Timed Treatment:   56   mins   Total Treatment:     56   mins    Samuel Francisco, PT  Physical Therapist

## 2025-04-25 ENCOUNTER — TREATMENT (OUTPATIENT)
Dept: PHYSICAL THERAPY | Facility: CLINIC | Age: 45
End: 2025-04-25
Payer: COMMERCIAL

## 2025-04-25 DIAGNOSIS — M25.511 CHRONIC PAIN OF BOTH SHOULDERS: Primary | ICD-10-CM

## 2025-04-25 DIAGNOSIS — G89.29 CHRONIC PAIN OF BOTH SHOULDERS: Primary | ICD-10-CM

## 2025-04-25 DIAGNOSIS — M25.512 CHRONIC PAIN OF BOTH SHOULDERS: Primary | ICD-10-CM

## 2025-04-25 PROCEDURE — 97110 THERAPEUTIC EXERCISES: CPT | Performed by: PHYSICAL THERAPIST

## 2025-04-25 PROCEDURE — 97140 MANUAL THERAPY 1/> REGIONS: CPT | Performed by: PHYSICAL THERAPIST

## 2025-04-25 PROCEDURE — 97112 NEUROMUSCULAR REEDUCATION: CPT | Performed by: PHYSICAL THERAPIST

## 2025-04-25 NOTE — PROGRESS NOTES
Physical Therapy Daily Treatment Note      Visit #: 6    Linda Snell reports 5/10 pain today at rest.  Pt reports that her shoulder have been very tender and sore since last session. Pt reports that initially, she could barely touch the fronts of her shoulders but it has decreased to soreness and pain deeper which is more tolerable.         Objective          Passive Range of Motion   Left Shoulder   Flexion: 165 degrees   Abduction: 166 degrees   External rotation 90°: 78 degrees   Internal rotation 90°: 83 degrees     Right Shoulder   Flexion: 170 degrees   Abduction: 176 degrees   External rotation 90°: 66 degrees   Internal rotation 90°: 90 degrees       Pt present to PT today with no distress at rest.     Pt with tenderness in the R long head of the biceps.     Pt with no increased pain in the shoulders with activities today       See Exercise, Manual, and Modality Logs for complete treatment.     Assessment/Plan  Pt is doing well with improved mobility of the shoulders although still has tightness through the anterior shoulders causing some pain. Pt is doing well with HEP to relieve pain and will be put on hold for 30 days to perform trial of independent care.       Progress per Plan of Care      Visit Diagnosis:    ICD-10-CM ICD-9-CM   1. Chronic pain of both shoulders  M25.511 719.41    G89.29 338.29    M25.512               Timed Treatment:  Manual Therapy:    16     mins  69215;  Therapeutic Exercise:    25     mins  93513;     Neuromuscular Susan:    12    mins  23966;    Therapeutic Activity:          mins  32414;     Gait Training:           mins  18927;     Ultrasound:          mins  77128;    Electrical Stimulation:         mins  56463 ( );  Dry Needling          mins self-pay  Iontophoresis          mins 02224    Untimed Treatments:  Electrical Stimulation:         mins  72515 ( );  Dry Needling:                     mins  Ultrasound:                         mins  24593;        Timed  Treatment:   53   mins   Total Treatment:     53   mins    Samuel Francisco, PT  Physical Therapist

## 2025-05-27 RX ORDER — LOSARTAN POTASSIUM 50 MG/1
50 TABLET ORAL DAILY
Qty: 90 TABLET | Refills: 3 | Status: SHIPPED | OUTPATIENT
Start: 2025-05-27 | End: 2025-05-28 | Stop reason: SDUPTHER

## 2025-05-27 NOTE — TELEPHONE ENCOUNTER
Rx Refill Note  Requested Prescriptions     Pending Prescriptions Disp Refills    losartan (COZAAR) 50 MG tablet 90 tablet 3     Sig: Take 1 tablet by mouth Daily.      Last office visit with prescribing clinician: 3/18/2025   Last telemedicine visit with prescribing clinician: Visit date not found   Next office visit with prescribing clinician: 6/17/2025                         Marivel Mallory MA  05/27/25, 10:32 EDT

## 2025-05-28 RX ORDER — LOSARTAN POTASSIUM 50 MG/1
50 TABLET ORAL DAILY
Qty: 90 TABLET | Refills: 3 | Status: SHIPPED | OUTPATIENT
Start: 2025-05-28

## 2025-05-28 NOTE — TELEPHONE ENCOUNTER
Caller: Linda Snell May    Relationship: Self    Best call back number: [phone]     Requested Prescriptions:   Requested Prescriptions     Pending Prescriptions Disp Refills    losartan (COZAAR) 50 MG tablet 90 tablet 3     Sig: Take 1 tablet by mouth Daily.        Pharmacy where request should be sent: [id number], [place] - [address] - [phone]  - [phone]     Last office visit with prescribing clinician: 3/18/2025   Last telemedicine visit with prescribing clinician: Visit date not found   Next office visit with prescribing clinician: 6/17/2025     Additional details provided by patient: 2 PILLS ON HAND     Does the patient have less than a 3 day supply:  [x] Yes  [] No    Would you like a call back once the refill request has been completed: [] Yes [] No    If the office needs to give you a call back, can they leave a voicemail: [] Yes [] No    Ishmael Thomas Rep   05/28/25 12:25 EDT

## 2025-06-17 ENCOUNTER — OFFICE VISIT (OUTPATIENT)
Dept: INTERNAL MEDICINE | Facility: CLINIC | Age: 45
End: 2025-06-17
Payer: COMMERCIAL

## 2025-06-17 VITALS
BODY MASS INDEX: 40.25 KG/M2 | HEIGHT: 60 IN | TEMPERATURE: 97.5 F | WEIGHT: 205 LBS | OXYGEN SATURATION: 98 % | SYSTOLIC BLOOD PRESSURE: 124 MMHG | HEART RATE: 101 BPM | DIASTOLIC BLOOD PRESSURE: 78 MMHG

## 2025-06-17 DIAGNOSIS — E53.8 B12 DEFICIENCY: ICD-10-CM

## 2025-06-17 DIAGNOSIS — Z00.00 ANNUAL PHYSICAL EXAM: Primary | ICD-10-CM

## 2025-06-17 DIAGNOSIS — I10 PRIMARY HYPERTENSION: ICD-10-CM

## 2025-06-17 DIAGNOSIS — E55.9 VITAMIN D DEFICIENCY: ICD-10-CM

## 2025-06-17 PROCEDURE — 99396 PREV VISIT EST AGE 40-64: CPT | Performed by: NURSE PRACTITIONER

## 2025-06-18 LAB — 25(OH)D3+25(OH)D2 SERPL-MCNC: 33.7 NG/ML (ref 30–100)

## 2025-06-18 NOTE — PROGRESS NOTES
Female Physical Note      Date: 2025   Patient Name: Linda Snell  : 1980   MRN: [id number]     Chief Complaint:    Chief Complaint   Patient presents with    Annual Exam       History of Present Illness: Linda Snell is a 45 y.o. female who is here today for annual health maintenance and physical.  HTN stable on losartan 50 mg daily. No chest pain, soa, visual changes, headaches, edema. Sometimes gets lightheaded but BP is stable when this occurs  Does not drink enough water   Chronic Vitamin D deficiency- on high dose weekly supplement currently   Vitamin B12 borderline deficient at 259-  she purchased B12 vitamin drops and is asking about dosage   Vaccines/screenings reviewed    Subjective      Review of Systems:   Negative    Past Medical History, Social History, Family History and Care Team were all reviewed with patient and updated as appropriate.     Medical History:  Past Medical History:   Diagnosis Date    Cholelithiasis 24    Colon polyp     GERD (gastroesophageal reflux disease)     Glaucoma     Graefe's disease     Graves disease     Hypertension     Hyperthyroidism     Hypothyroidism     Obesity     body mass index 30+-Obesity--Abstracted from [facility]     Urinary tract infection         Visual impairment     Wears glasses    Vitamin D deficiency        Medications:     Current Outpatient Medications:     losartan (COZAAR) 50 MG tablet, Take 1 tablet by mouth Daily., Disp: 90 tablet, Rfl: 3    vitamin D (ERGOCALCIFEROL) 1.25 MG (60008 UT) capsule capsule, Take 1 capsule by mouth 1 (One) Time Per Week. For 12 weeks, Disp: 12 capsule, Rfl: 0    Allergies:   Allergies   Allergen Reactions    Sulfamethoxazole-Trimethoprim Unknown - Low Severity    Adhesive Tape Rash    Propylthiouracil Rash    Shellfish-Derived Products Rash     Surgical History:  Past Surgical History:   Procedure Laterality Date    CHOLECYSTECTOMY N/A 2024    Procedure:  CHOLECYSTECTOMY LAPAROSCOPIC WITH INTRAOPERATIVE CHOLANGIOGRAPHY;  Surgeon: Reena Cao MD;  Location: [facility];  Service: General;  Laterality: N/A;    COLONOSCOPY  2000    ENDOSCOPY      PELVIC ABCESS DRAINAGE      WISDOM TOOTH EXTRACTION        Immunizations:  Immunization History   Administered Date(s) Administered    COVID-19 (PFIZER) Purple Cap Monovalent 08/31/2021, 09/24/2021    Hep B, Adolescent or Pediatric 06/11/2010, 10/15/2010, 04/27/2011    Hepatitis A 11/05/2018    MMR 02/17/2010, 04/27/2011    Tdap 06/11/2010, 01/29/2025    Varicella 02/17/2010     Pap:   Last Completed Pap Smear            Upcoming       PAP SMEAR (Every 3 Years) Next due on 1/23/2026 01/23/2023  Patient-Reported (Performed Externally)                           Mammogram:   Last Completed Mammogram            Upcoming       MAMMOGRAM (Every 2 Years) Next due on 11/2/2026 11/02/2024  Mammo Screening Digital Tomosynthesis Bilateral With CAD    10/04/2022  Mammo Screening Digital Tomosynthesis Bilateral With CAD    03/24/2021  Mammo Diagnostic Digital Tomosynthesis Left With CAD    03/08/2021  Mammo Screening Digital Tomosynthesis Bilateral With CAD                             Colorectal Screening: Due  Last Completed Colonoscopy    This patient has no relevant Health Maintenance data.       CT for Smoker (Age 50-80, 20 pk yr):  N/A  Bone Density/DEXA (age 65 or high risk; hx of fx not caused by major trauma, <127 lbs or BMI <21, smoker, alcohol use, long term steroid use, RA, family hx, early menopause <45): N/A    Social History:  Tobacco Use: Low Risk  (6/18/2025)    Patient History     Smoking Tobacco Use: Never     Smokeless Tobacco Use: Never     Passive Exposure: Not on file     Social History     Substance and Sexual Activity   Alcohol Use Never      Social History     Substance and Sexual Activity   Drug Use Never        PHQ-2 Depression Screening  Little interest or pleasure in doing things? Not at all  "  Feeling down, depressed, or hopeless? Not at all   PHQ-2 Total Score 0     The 10-year ASCVD risk score (Isauro SPEAR, et al., 2019) is: 1.8%    Values used to calculate the score:      Age: 45 years      Sex: Female      Is Non- : No      Diabetic: No      Tobacco smoker: No      Systolic Blood Pressure: 124 mmHg      Is BP treated: Yes      HDL Cholesterol: 36 mg/dL      Total Cholesterol: 187 mg/dL    Labs:  Results for orders placed or performed in visit on 06/17/25   Vitamin D,25-Hydroxy    Collection Time: 06/17/25  1:49 PM    Specimen: Blood   Result Value Ref Range    25 Hydroxy, Vitamin D 33.7 30.0 - 100.0 ng/ml     Objective     Vital Signs:   Vitals:    06/17/25 1258   BP: 124/78   Pulse: 101   Temp: 97.5 °F (36.4 °C)   SpO2: 98%   Weight: 93 kg (205 lb)   Height: 152.4 cm (60\")   PainSc: 0-No pain     Physical Exam:  General: well-developed, well-nourished, in no acute distress, stated age, obese   HEENT: normocephalic, atraumatic. PERRLA. EOM intact. TM clear bilaterally. Pink mucosa. Oropharynx clear  Neck: supple, no lymphadenopathy, thyromegaly, or masses. Normal ROM  CV: RRR. No murmurs, rubs, gallops. No peripheral edema or varicosities   Respiratory: Clear bilaterally. No wheezes, rales or rhonchi.   Abdomen: soft, nontender. No hepatosplenomegaly. Bowel sounds normal.  Musculoskeletal: ROM normal. No obvious joint swelling or deformity.   Neuro: A&O x 2. Cranial nerves II-XII intact. No focal deficits.  Skin: warm, dry, without obvious rashes or lesions.  Psych: Normal mood and affect. Though process coherent.     Assessment / Plan      Assessment/Plan:   Diagnoses and all orders for this visit:    1. Annual physical exam (Primary)    2. Primary hypertension    3. Vitamin D deficiency  -     Vitamin D,25-Hydroxy    4. B12 deficiency      Plan:   HTN stable.  Continue medication as prescribed, DASH diet, 150 minutes of cardiovascular exercise weekly, home blood pressure " monitoring.  Recheck Vitamin D, continue high dose for now   Pt showed me which B12 she is taking, ok to take 12.5-25 drops per day which is equal to 500-1000 mcg daily, plan to recheck b12 3 months     Healthcare Maintenance:  Counseling provided based on age appropriate USPSTF guidelines and vaccinations   Encouraged 150 minutes of exercise total per week for heart health   Recommend balanced healthy diet   Avoid excess caffeine, alcohol, tobacco/nicotine, illicit substances   Dental visits twice per year, yearly eye exams, yearly skin assessments with dermatology   Pap every 5 years, self breast exam once per month, mammogram annually   Colonoscopy every 10 years or cologuard every 3 years (will contact and schedule with [facility])     Linda Snlel voices understanding and acceptance of this advice and will call back with any further questions or concerns. AVS with preventive healthcare tips printed for patient.     Follow Up:   Return in about 6 months (around 12/17/2025) for HTN.    SEBASTIAN Rider  [facility]

## (undated) DEVICE — ADHS LIQ MASTISOL 2/3ML

## (undated) DEVICE — KT CATH CHOLANGIOGRA PERC W/BALLO

## (undated) DEVICE — ST TBG PNEUMOCLEAR EVAC SMOKE HIFLO

## (undated) DEVICE — ENDOPATH XCEL BLADELESS TROCARS WITH STABILITY SLEEVES: Brand: ENDOPATH XCEL

## (undated) DEVICE — ENDOPOUCH RETRIEVER SPECIMEN RETRIEVAL BAGS: Brand: ENDOPOUCH RETRIEVER

## (undated) DEVICE — TP ELECTRD LAP L WR SPLIT33CM

## (undated) DEVICE — CLAVICLE STRAP: Brand: DEROYAL

## (undated) DEVICE — UNDYED MONOFILAMENT (POLYDIOXANONE), ABSORBABLE SYNTHETIC SURGICAL SUTURE: Brand: PDS

## (undated) DEVICE — PDS II VLT 0 107CM AG ST3: Brand: ENDOLOOP

## (undated) DEVICE — ENDOPATH XCEL UNIVERSAL TROCAR STABLILITY SLEEVES: Brand: ENDOPATH XCEL

## (undated) DEVICE — APPL HEMO SURG ARISTA/AH/FLEXITIP XLR 38CM

## (undated) DEVICE — RICH GENERAL LAPAROSCOPY: Brand: MEDLINE INDUSTRIES, INC.

## (undated) DEVICE — SUT ANTIBAC PDS PLS CT/2 SZ0 27IN VIL PDP334H

## (undated) DEVICE — SLV SCD CALF HEMOFORCE DVT THERP REPROC MD

## (undated) DEVICE — MONOPOLAR METZENBAUM SCISSOR, MINI BLADE TIP, DISPOSABLE: Brand: MONOPOLAR METZENBAUM SCISSOR, MINI BLADE TIP, DISPOSABLE

## (undated) DEVICE — DISPOSABLE MONOPOLAR ENDOSCOPIC CORD 10 FT. (3M): Brand: KIRWAN

## (undated) DEVICE — SUT VIC 0/0 UR6 27IN DYED J603H

## (undated) DEVICE — GLV SURG SENSICARE GREEN W/ALOE PF LF 6 STRL

## (undated) DEVICE — DRSNG SURESITE WNDW 4X4.5

## (undated) DEVICE — GLV SURG ULTRATOUCH BIOGEL/COAT PF LF SZ6 STRL

## (undated) DEVICE — BNDG ADHS PLSTC 1X3IN LF

## (undated) DEVICE — 2, DISPOSABLE SUCTION/IRRIGATOR WITHOUT DISPOSABLE TIP: Brand: STRYKEFLOW

## (undated) DEVICE — NDL HYPO ECLPS SFTY 22G 1 1/2IN

## (undated) DEVICE — SYR LL TP 10ML STRL

## (undated) DEVICE — GAUZE,SPONGE,4"X4",12PLY,STERILE,LF,2'S: Brand: MEDLINE

## (undated) DEVICE — ENDOPATH XCEL BLUNT TIP TROCARS WITH SMOOTH SLEEVES: Brand: ENDOPATH XCEL

## (undated) DEVICE — SOL NACL 0.9PCT 1000ML